# Patient Record
Sex: FEMALE | Race: WHITE | NOT HISPANIC OR LATINO | Employment: UNEMPLOYED | URBAN - METROPOLITAN AREA
[De-identification: names, ages, dates, MRNs, and addresses within clinical notes are randomized per-mention and may not be internally consistent; named-entity substitution may affect disease eponyms.]

---

## 2017-02-08 ENCOUNTER — TELEMEDICINE2 (OUTPATIENT)
Dept: MEDICAL GROUP | Age: 52
End: 2017-02-08
Payer: MEDICAID

## 2017-02-08 ENCOUNTER — TELEMEDICINE ORIGINATING SITE VISIT (OUTPATIENT)
Dept: MEDICAL GROUP | Facility: CLINIC | Age: 52
End: 2017-02-08
Payer: MEDICAID

## 2017-02-08 VITALS
RESPIRATION RATE: 16 BRPM | BODY MASS INDEX: 23.92 KG/M2 | TEMPERATURE: 97.5 F | WEIGHT: 130 LBS | HEIGHT: 62 IN | SYSTOLIC BLOOD PRESSURE: 156 MMHG | OXYGEN SATURATION: 94 % | DIASTOLIC BLOOD PRESSURE: 98 MMHG | HEART RATE: 58 BPM

## 2017-02-08 DIAGNOSIS — F32.A DEPRESSION, UNSPECIFIED DEPRESSION TYPE: ICD-10-CM

## 2017-02-08 DIAGNOSIS — F41.9 ANXIETY: ICD-10-CM

## 2017-02-08 DIAGNOSIS — R10.11 RIGHT UPPER QUADRANT PAIN: ICD-10-CM

## 2017-02-08 DIAGNOSIS — Z13.220 SCREENING CHOLESTEROL LEVEL: ICD-10-CM

## 2017-02-08 DIAGNOSIS — I10 ESSENTIAL HYPERTENSION: ICD-10-CM

## 2017-02-08 DIAGNOSIS — E55.9 VITAMIN D DEFICIENCY: ICD-10-CM

## 2017-02-08 DIAGNOSIS — R53.83 FATIGUE, UNSPECIFIED TYPE: ICD-10-CM

## 2017-02-08 PROCEDURE — 99214 OFFICE O/P EST MOD 30 MIN: CPT | Mod: GT | Performed by: INTERNAL MEDICINE

## 2017-02-08 RX ORDER — BUPROPION HYDROCHLORIDE 150 MG/1
150 TABLET ORAL EVERY MORNING
Qty: 30 TAB | Refills: 3 | Status: SHIPPED | OUTPATIENT
Start: 2017-02-08 | End: 2019-04-08

## 2017-02-08 NOTE — MR AVS SNAPSHOT
"Qian Rodarte   2017 2:30 PM   Telemedicine2   MRN: [id number]    Department:  [address] [place] [address]   Dept Phone:  [phone]    Description:  Female : 1965   Provider:  Caitlin Wong M.D.; LEXA GARCIA           Reason for Visit     Medication Refill     Other lab orders      Allergies as of 2017     No Known Allergies      You were diagnosed with     Essential hypertension   [6146165]       Depression, unspecified depression type   [9689057]       Right upper quadrant pain   [[id number]]         Vital Signs     Blood Pressure Pulse Temperature Respirations Height Weight    156/98 mmHg 58 36.4 °C (97.5 °F) 16 1.575 m (5' 2\") 58.968 kg (130 lb)    Body Mass Index Oxygen Saturation Smoking Status             23.77 kg/m2 94% Current Every Day Smoker         Basic Information     Date Of Birth Sex Race Ethnicity Preferred Language    1965 Female White Non- English      Your appointments     Mar 01, 2017  2:30 PM   Telemedicine Clinic Established Pt with Caitlin Wong M.D., LEXA SALOMON[address] [address]   [phone]              Problem List              ICD-10-CM Priority Class Noted - Resolved    HTN (hypertension) I10   2016 - Present    Anxiety F41.9   2016 - Present    Depression F32.9   2016 - Present    Right upper quadrant pain R10.11   2017 - Present      Health Maintenance        Date Due Completion Dates    IMM DTaP/Tdap/Td Vaccine (1 - Tdap) 1983 ---    PAP SMEAR 1985 ---    MAMMOGRAM 2004 ---    COLONOSCOPY 2014 ---    IMM INFLUENZA (1) 2016 ---            Current Immunizations     No immunizations on file.      Below and/or attached are the medications your provider expects you to take. Review all of your home medications and newly ordered medications with your provider and/or pharmacist. Follow medication instructions as directed by your provider " and/or pharmacist. Please keep your medication list with you and share with your provider. Update the information when medications are discontinued, doses are changed, or new medications (including over-the-counter products) are added; and carry medication information at all times in the event of emergency situations     Allergies:  No Known Allergies          Medications  Valid as of: February 08, 2017 -  3:15 PM    Generic Name Brand Name Tablet Size Instructions for use    Propranolol HCl (Tab) INDERAL 10 MG Take 1 Tab by mouth 3 times a day.        .                 Medicines prescribed today were sent to:     [id number] - [place], [place] - [address]    [address] [place] [address]    Phone: [phone] Fax: [phone]    Open 24 Hours?: No      Medication refill instructions:       If your prescription bottle indicates you have medication refills left, it is not necessary to call your provider’s office. Please contact your pharmacy and they will refill your medication.    If your prescription bottle indicates you do not have any refills left, you may request refills at any time through one of the following ways: The online PlastiPure system (except Urgent Care), by calling your provider’s office, or by asking your pharmacy to contact your provider’s office with a refill request. Medication refills are processed only during regular business hours and may not be available until the next business day. Your provider may request additional information or to have a follow-up visit with you prior to refilling your medication.   *Please Note: Medication refills are assigned a new Rx number when refilled electronically. Your pharmacy may indicate that no refills were authorized even though a new prescription for the same medication is available at the pharmacy. Please request the medicine by name with the pharmacy before contacting your provider for a refill.           PlastiPure Access Code:  [email or web address]  Expires: 3/10/2017  3:15 PM    JoySports  A secure, online tool to manage your health information     [employer]’s JoySports® is a secure, online tool that connects you to your personalized health information from the privacy of your home -- day or night - making it very easy for you to manage your healthcare. Once the activation process is completed, you can even access your medical information using the JoySports sirena, which is available for free in the Apple Sirena store or Google Play store.     JoySports provides the following levels of access (as shown below):   My Chart Features   [facility] Primary Care Doctor [facility]  Specialists [facility]  Urgent  Care Non-[facility]  Primary Care  Doctor   Email your healthcare team securely and privately 24/7 X X X    Manage appointments: schedule your next appointment; view details of past/upcoming appointments X      Request prescription refills. X      View recent personal medical records, including lab and immunizations X X X X   View health record, including health history, allergies, medications X X X X   Read reports about your outpatient visits, procedures, consult and ER notes X X X X   See your discharge summary, which is a recap of your hospital and/or ER visit that includes your diagnosis, lab results, and care plan. X X       How to register for JoySports:  1. Go to  [email or web address].  2. Click on the Sign Up Now box, which takes you to the New Member Sign Up page. You will need to provide the following information:  a. Enter your JoySports Access Code exactly as it appears at the top of this page. (You will not need to use this code after you’ve completed the sign-up process. If you do not sign up before the expiration date, you must request a new code.)   b. Enter your date of birth.   c. Enter your home email address.   d. Click Submit, and follow the next screen’s instructions.  3. Create a JoySports ID. This will be your JoySports login ID and cannot be  changed, so think of one that is secure and easy to remember.  4. Create a SportsHedge password. You can change your password at any time.  5. Enter your Password Reset Question and Answer. This can be used at a later time if you forget your password.   6. Enter your e-mail address. This allows you to receive e-mail notifications when new information is available in SportsHedge.  7. Click Sign Up. You can now view your health information.    For assistance activating your SportsHedge account, call [phone]

## 2017-02-13 ENCOUNTER — NON-PROVIDER VISIT (OUTPATIENT)
Dept: MEDICAL GROUP | Facility: CLINIC | Age: 52
End: 2017-02-13
Payer: MEDICAID

## 2017-02-13 NOTE — PROGRESS NOTES
Qian Rodarte is a 51 y.o. female here for a non-provider visit for Venipuncture.    If abnormal was an in office provider notified on receipt of results (if so, indicate provider)? Yes  Routed to PCP? Yes

## 2017-02-13 NOTE — PROGRESS NOTES
"CC: Depression    HPI:   Qian presents today with the following.    1. Essential hypertension  Patient currently taking Inderal 10 mg 3 times a day for blood pressure and for anxiety.    2. Depression, unspecified depression type  Patient has a history of depression and was treated with Prozac for many years. She has been off of her Prozac 2008 but also states that she had a suicide attempt in 2008. Treated inpatient. Patient is noticing increased symptoms of her OCD and depression recurring. Associated with anxiety Her  is disabled, patient is caretaker. Patient has tried Wellbutrin in the past and would like to restart this medication. She is currently not followed by psychology or psychiatry. No last completed.    3. Right upper quadrant pain  Complaints of right upper quadrant fullness, tenderness. No radiation of pain. Not associated with falling. Symptoms present for the last. Has a history of gallbladder disease. Patient denies nausea, vomiting, weight loss, diarrhea. No fevers or chills. No history of liver disease        Patient Active Problem List    Diagnosis Date Noted   • Right upper quadrant pain 02/08/2017   • HTN (hypertension) 06/09/2016   • Anxiety 06/09/2016   • Depression 06/09/2016       Current Outpatient Prescriptions   Medication Sig Dispense Refill   • buPROPion (WELLBUTRIN XL) 150 MG XL tablet Take 1 Tab by mouth every morning. 30 Tab 3   • propranolol (INDERAL) 10 MG Tab Take 1 Tab by mouth 3 times a day. 90 Tab 6     No current facility-administered medications for this visit.         Allergies as of 02/08/2017   • (No Known Allergies)        ROS: As per HPI.    /98 mmHg  Pulse 58  Temp(Src) 36.4 °C (97.5 °F)  Resp 16  Ht 1.575 m (5' 2\")  Wt 58.968 kg (130 lb)  BMI 23.77 kg/m2  SpO2 94%    Physical Exam:  Gen:         Alert and oriented, No apparent distress.  Neck:        No Lymphadenopathy or Bruits.  Lungs:     Clear to auscultation bilaterally  CV:          " Regular rate and rhythm. No murmurs, rubs or gallops.  Abd:         Positive right upper quadrant tenderness, questionable fullness. Questionable palpation of liver edge. No rebound no guarding, positive bowel sounds. Soft. No pulsatile masses.                   Ext:          No clubbing, cyanosis, edema.      Assessment and Plan.   51 y.o. female with the following issues.    1. Essential hypertension  Continue Inderal    - COMP METABOLIC PANEL; Future    2. Depression, unspecified depression type  Start Wellbutrin  Offered resources in [place] to include [facility] health clinic    - TSH; Future  - buPROPion (WELLBUTRIN XL) 150 MG XL tablet; Take 1 Tab by mouth every morning.  Dispense: 30 Tab; Refill: 3    3. Right upper quadrant pain  Labs ordered  Consider abdominal ultrasound  Patient stable    - COMP METABOLIC PANEL; Future  - AMYLASE; Future  - LIPASE; Future    4. Screening cholesterol level    - LIPID PROFILE; Future    5. Vitamin D deficiency    - VITAMIN D,25 HYDROXY; Future    6. Fatigue, unspecified type    - CBC WITH DIFFERENTIAL; Future  - TSH; Future    7. Anxiety   Referred to #2  - buPROPion (WELLBUTRIN XL) 150 MG XL tablet; Take 1 Tab by mouth every morning.  Dispense: 30 Tab; Refill: 3

## 2017-02-13 NOTE — MR AVS SNAPSHOT
Qian Rodarte   2017 11:00 AM   Appointment   MRN: [id number]    Department:  [facility]   Dept Phone:  [phone]    Description:  Female : 1965   Provider:  RADHA HURLEY           Allergies as of 2017     No Known Allergies      Vital Signs     Smoking Status                   Current Every Day Smoker           Basic Information     Date Of Birth Sex Race Ethnicity Preferred Language    1965 Female White Non- English      Your appointments     2017 11:00 AM   Non Provider 1 with RADHA HURLEY   [facility] (--)    [address] [facility] [address]   [phone]           You will be receiving a confirmation call a few days before your appointment from our automated call confirmation system.            Mar 01, 2017  2:30 PM   Telemedicine Clinic Established Pt with Caitlin Wong M.D., [facility] [place] [address] [facility] [id number] [phone]              Problem List              ICD-10-CM Priority Class Noted - Resolved    HTN (hypertension) I10   2016 - Present    Anxiety F41.9   2016 - Present    Depression F32.9   2016 - Present    Right upper quadrant pain R10.11   2017 - Present      Health Maintenance        Date Due Completion Dates    IMM DTaP/Tdap/Td Vaccine (1 - Tdap) 1983 ---    PAP SMEAR 1985 ---    MAMMOGRAM 2004 ---    COLONOSCOPY 2014 ---    IMM INFLUENZA (1) 2016 ---            Current Immunizations     No immunizations on file.      Below and/or attached are the medications your provider expects you to take. Review all of your home medications and newly ordered medications with your provider and/or pharmacist. Follow medication instructions as directed by your provider and/or pharmacist. Please keep your medication list with you and share with your provider. Update the information when medications are discontinued, doses are  changed, or new medications (including over-the-counter products) are added; and carry medication information at all times in the event of emergency situations     Allergies:  No Known Allergies          Medications  Valid as of: February 13, 2017 - 10:30 AM    Generic Name Brand Name Tablet Size Instructions for use    BuPROPion HCl (TABLET SR 24 HR) WELLBUTRIN  MG Take 1 Tab by mouth every morning.        Propranolol HCl (Tab) INDERAL 10 MG Take 1 Tab by mouth 3 times a day.        .                 Medicines prescribed today were sent to:     [facility] - [place], [place] - [address]    [address] [place] [address]    Phone: [phone] Fax: [phone]    Open 24 Hours?: No      Medication refill instructions:       If your prescription bottle indicates you have medication refills left, it is not necessary to call your provider’s office. Please contact your pharmacy and they will refill your medication.    If your prescription bottle indicates you do not have any refills left, you may request refills at any time through one of the following ways: The online ipadio system (except Urgent Care), by calling your provider’s office, or by asking your pharmacy to contact your provider’s office with a refill request. Medication refills are processed only during regular business hours and may not be available until the next business day. Your provider may request additional information or to have a follow-up visit with you prior to refilling your medication.   *Please Note: Medication refills are assigned a new Rx number when refilled electronically. Your pharmacy may indicate that no refills were authorized even though a new prescription for the same medication is available at the pharmacy. Please request the medicine by name with the pharmacy before contacting your provider for a refill.           ipadio Access Code: [email or web address]  Expires: 3/10/2017  3:15 PM    ipadio  A secure, online  tool to manage your health information     [employer]’s [employer]® is a secure, online tool that connects you to your personalized health information from the privacy of your home -- day or night - making it very easy for you to manage your healthcare. Once the activation process is completed, you can even access your medical information using the [employer] sirena, which is available for free in the Apple Sirena store or Google Play store.     [employer] provides the following levels of access (as shown below):   My Chart Features   [facility] Primary Care Doctor [facility]  Specialists [facility]  Urgent  Care Non-[facility]  Primary Care  Doctor   Email your healthcare team securely and privately 24/7 X X X    Manage appointments: schedule your next appointment; view details of past/upcoming appointments X      Request prescription refills. X      View recent personal medical records, including lab and immunizations X X X X   View health record, including health history, allergies, medications X X X X   Read reports about your outpatient visits, procedures, consult and ER notes X X X X   See your discharge summary, which is a recap of your hospital and/or ER visit that includes your diagnosis, lab results, and care plan. X X       How to register for [employer]:  1. Go to  [email or web address].  2. Click on the Sign Up Now box, which takes you to the New Member Sign Up page. You will need to provide the following information:  a. Enter your [employer] Access Code exactly as it appears at the top of this page. (You will not need to use this code after you’ve completed the sign-up process. If you do not sign up before the expiration date, you must request a new code.)   b. Enter your date of birth.   c. Enter your home email address.   d. Click Submit, and follow the next screen’s instructions.  3. Create a [employer] ID. This will be your [employer] login ID and cannot be changed, so think of one that is secure and easy to remember.  4. Create a  Letao password. You can change your password at any time.  5. Enter your Password Reset Question and Answer. This can be used at a later time if you forget your password.   6. Enter your e-mail address. This allows you to receive e-mail notifications when new information is available in Letao.  7. Click Sign Up. You can now view your health information.    For assistance activating your Letao account, call [phone]

## 2017-03-01 ENCOUNTER — TELEMEDICINE2 (OUTPATIENT)
Dept: MEDICAL GROUP | Age: 52
End: 2017-03-01
Payer: MEDICAID

## 2017-03-01 ENCOUNTER — TELEMEDICINE ORIGINATING SITE VISIT (OUTPATIENT)
Dept: MEDICAL GROUP | Facility: CLINIC | Age: 52
End: 2017-03-01
Payer: MEDICAID

## 2017-03-01 VITALS
OXYGEN SATURATION: 98 % | DIASTOLIC BLOOD PRESSURE: 90 MMHG | TEMPERATURE: 98.4 F | HEART RATE: 80 BPM | SYSTOLIC BLOOD PRESSURE: 162 MMHG | BODY MASS INDEX: 24.84 KG/M2 | WEIGHT: 135 LBS | HEIGHT: 62 IN | RESPIRATION RATE: 16 BRPM

## 2017-03-01 DIAGNOSIS — R79.89 ABNORMAL CBC: ICD-10-CM

## 2017-03-01 DIAGNOSIS — I10 ESSENTIAL HYPERTENSION: ICD-10-CM

## 2017-03-01 DIAGNOSIS — F32.A DEPRESSION, UNSPECIFIED DEPRESSION TYPE: ICD-10-CM

## 2017-03-01 PROCEDURE — 99214 OFFICE O/P EST MOD 30 MIN: CPT | Mod: GT | Performed by: INTERNAL MEDICINE

## 2017-03-01 RX ORDER — BUPROPION HYDROCHLORIDE 300 MG/1
300 TABLET ORAL EVERY MORNING
Qty: 30 TAB | Refills: 6 | Status: SHIPPED | OUTPATIENT
Start: 2017-03-01 | End: 2017-10-02 | Stop reason: SDUPTHER

## 2017-03-01 NOTE — PROGRESS NOTES
CC: Follow-up lab work, medication change    Verified identification with patient. Secured video conference with RN presenter in [facility]      HPI:   Qian presents today with the following.    1. Essential hypertension  Patient currently takes Inderal 10 mg 3 times a day for blood pressure management and to help with anxiety. Blood pressure elevated today 162/90, repeat 167/92. Patient does not have a blood pressure monitor at home.    2. Depression, unspecified depression type  Patient started Wellbutrin  mg daily. Patient states that she feels about 30% better. Her mood and depression have improved. Also sees improvement in anxiety and OCD. No side effect of medication. Patient is less reclusive, has less anxiety outside the home.    3. Abnormal CBC  Abnormal CBC with slightly elevated WBC at 12,000, increase hemoglobin and platelets. Increased absolute neutrophil count and decreased monocytes. Patient has a family history of leukemia in her mom.    5. Abdominal pain  Symptoms of abdominal discomfort and fullness has significantly decreased. Patient discontinued chewing tobacco and alcohol. Patient was consuming 32 ounces of beer a day. CMP, amylase and lipase are all within normal limits. TSH 2.5.      Patient Active Problem List    Diagnosis Date Noted   • Abnormal CBC 03/01/2017   • Right upper quadrant pain 02/08/2017   • HTN (hypertension) 06/09/2016   • Anxiety 06/09/2016   • Depression 06/09/2016       Current Outpatient Prescriptions   Medication Sig Dispense Refill   • buPROPion (WELLBUTRIN XL) 300 MG XL tablet Take 1 Tab by mouth every morning. 30 Tab 6   • buPROPion (WELLBUTRIN XL) 150 MG XL tablet Take 1 Tab by mouth every morning. 30 Tab 3   • propranolol (INDERAL) 10 MG Tab Take 1 Tab by mouth 3 times a day. 90 Tab 6     No current facility-administered medications for this visit.         Allergies as of 03/01/2017   • (No Known Allergies)        ROS: As per HPI. All other symptoms  "constitutional, cardiopulmonary, GI, neurologic negative    /90 mmHg  Pulse 80  Temp(Src) 36.9 °C (98.4 °F)  Resp 16  Ht 1.575 m (5' 2\")  Wt 61.236 kg (135 lb)  BMI 24.69 kg/m2  SpO2 98%    Physical Exam:  Gen:         Alert and oriented, No apparent distress.  Neck:        No Lymphadenopathy or Bruits.  Lungs:     Clear to auscultation bilaterally  CV:          Regular rate and rhythm. No murmurs, rubs or gallops.  Abd:         Soft non tender, non distended. Normal active bowel sounds.  No  Hepatosplenomegaly, No pulsatile masses.                   Ext:          No clubbing, cyanosis, edema.      Assessment and Plan.   51 y.o. female with the following issues.    1. Essential hypertension  Patient will purchase a blood pressure monitor and wishes to keep a blood pressure log. Recheck in 2 weeks    2. Depression, unspecified depression type  Increase Wellbutrin to 300 mg daily  Follow-up in 2 weeks    - buPROPion (WELLBUTRIN XL) 300 MG XL tablet; Take 1 Tab by mouth every morning.  Dispense: 30 Tab; Refill: 6    3. Abnormal CBC  Recheck CBC    - CBC WITH DIFFERENTIAL; Future            Please note that this dictation was created using voice recognition software. I have made every reasonable attempt to correct obvious errors, but expect that there are errors of grammar and possible content that I did not discover before finalizing note.   "

## 2017-03-01 NOTE — MR AVS SNAPSHOT
"Qian Rodarte   3/1/2017 2:30 PM   Telemedicine2   MRN: [id number]    Department:  [address] [place] [address]   Dept Phone:  [phone]    Description:  Female : 1965   Provider:  Caitlin Wong M.D.; LEXA GARCIA           Reason for Visit     Follow-Up labs      Allergies as of 3/1/2017     No Known Allergies      You were diagnosed with     Essential hypertension   [7591029]       Depression, unspecified depression type   [4447555]       Abnormal CBC   [[id number]]         Vital Signs     Blood Pressure Pulse Temperature Respirations Height Weight    162/90 mmHg 80 36.9 °C (98.4 °F) 16 1.575 m (5' 2\") 61.236 kg (135 lb)    Body Mass Index Oxygen Saturation Smoking Status             24.69 kg/m2 98% Current Every Day Smoker         Basic Information     Date Of Birth Sex Race Ethnicity Preferred Language    1965 Female White Non- English      Your appointments     Mar 22, 2017  1:00 PM   Telemedicine Clinic Established Pt with Caitlin Wong M.D., LEXA GARCIA   [address])    [address] [place] [id number]   [phone]              Problem List              ICD-10-CM Priority Class Noted - Resolved    HTN (hypertension) I10   2016 - Present    Anxiety F41.9   2016 - Present    Depression F32.9   2016 - Present    Right upper quadrant pain R10.11   2017 - Present    Abnormal CBC R79.89   3/1/2017 - Present      Health Maintenance        Date Due Completion Dates    IMM DTaP/Tdap/Td Vaccine (1 - Tdap) 1983 ---    PAP SMEAR 1985 ---    MAMMOGRAM 2004 ---    COLONOSCOPY 2014 ---    IMM INFLUENZA (1) 2016 ---            Current Immunizations     No immunizations on file.      Below and/or attached are the medications your provider expects you to take. Review all of your home medications and newly ordered medications with your provider and/or pharmacist. Follow medication instructions as directed by your " provider and/or pharmacist. Please keep your medication list with you and share with your provider. Update the information when medications are discontinued, doses are changed, or new medications (including over-the-counter products) are added; and carry medication information at all times in the event of emergency situations     Allergies:  No Known Allergies          Medications  Valid as of: March 01, 2017 -  2:53 PM    Generic Name Brand Name Tablet Size Instructions for use    BuPROPion HCl (TABLET SR 24 HR) WELLBUTRIN  MG Take 1 Tab by mouth every morning.        Propranolol HCl (Tab) INDERAL 10 MG Take 1 Tab by mouth 3 times a day.        .                 Medicines prescribed today were sent to:     [id number] - [place], [place] - [address]    [address] [place] [address]    Phone: [phone] Fax: [phone]    Open 24 Hours?: No      Medication refill instructions:       If your prescription bottle indicates you have medication refills left, it is not necessary to call your provider’s office. Please contact your pharmacy and they will refill your medication.    If your prescription bottle indicates you do not have any refills left, you may request refills at any time through one of the following ways: The online [employer] system (except Urgent Care), by calling your provider’s office, or by asking your pharmacy to contact your provider’s office with a refill request. Medication refills are processed only during regular business hours and may not be available until the next business day. Your provider may request additional information or to have a follow-up visit with you prior to refilling your medication.   *Please Note: Medication refills are assigned a new Rx number when refilled electronically. Your pharmacy may indicate that no refills were authorized even though a new prescription for the same medication is available at the pharmacy. Please request the medicine by name with  the pharmacy before contacting your provider for a refill.           Cryoocyte Access Code: [email or web address]  Expires: 3/10/2017  3:15 PM    Cryoocyte  A secure, online tool to manage your health information     [employer]’s Cryoocyte® is a secure, online tool that connects you to your personalized health information from the privacy of your home -- day or night - making it very easy for you to manage your healthcare. Once the activation process is completed, you can even access your medical information using the Cryoocyte sirena, which is available for free in the Apple Sirena store or Google Play store.     Cryoocyte provides the following levels of access (as shown below):   My Chart Features   [facility] Doctor [facility]  Specialists [facility]  Urgent  [facility]  Primary Care  Doctor   Email your healthcare team securely and privately 24/7 X X X    Manage appointments: schedule your next appointment; view details of past/upcoming appointments X      Request prescription refills. X      View recent personal medical records, including lab and immunizations X X X X   View health record, including health history, allergies, medications X X X X   Read reports about your outpatient visits, procedures, consult and ER notes X X X X   See your discharge summary, which is a recap of your hospital and/or ER visit that includes your diagnosis, lab results, and care plan. X X       How to register for Cryoocyte:  1. Go to  [email or web address]  2. Click on the Sign Up Now box, which takes you to the New Member Sign Up page. You will need to provide the following information:  a. Enter your Cryoocyte Access Code exactly as it appears at the top of this page. (You will not need to use this code after you’ve completed the sign-up process. If you do not sign up before the expiration date, you must request a new code.)   b. Enter your date of birth.   c. Enter your home email address.   d. Click Submit, and follow the next  screen’s instructions.  3. Create a Thename.ist ID. This will be your Thename.ist login ID and cannot be changed, so think of one that is secure and easy to remember.  4. Create a Thename.ist password. You can change your password at any time.  5. Enter your Password Reset Question and Answer. This can be used at a later time if you forget your password.   6. Enter your e-mail address. This allows you to receive e-mail notifications when new information is available in ExecNote.  7. Click Sign Up. You can now view your health information.    For assistance activating your ExecNote account, call [phone]

## 2017-03-15 ENCOUNTER — NON-PROVIDER VISIT (OUTPATIENT)
Dept: MEDICAL GROUP | Facility: CLINIC | Age: 52
End: 2017-03-15
Payer: MEDICAID

## 2017-03-15 DIAGNOSIS — R79.89 ABNORMAL CBC: ICD-10-CM

## 2017-03-15 PROCEDURE — 36415 COLL VENOUS BLD VENIPUNCTURE: CPT | Performed by: FAMILY MEDICINE

## 2017-03-15 NOTE — PROGRESS NOTES
Qian Rodarte is a 51 y.o. female here for a non-provider visit for venipuncture.  Labs sent to [facility] for results.    If abnormal was an in office provider notified today (if so, indicate provider)? Yes  Routed to PCP? Yes

## 2017-03-22 DIAGNOSIS — I10 ESSENTIAL HYPERTENSION: ICD-10-CM

## 2017-03-22 RX ORDER — PROPRANOLOL HYDROCHLORIDE 10 MG/1
10 TABLET ORAL 3 TIMES DAILY
Qty: 90 TAB | Refills: 6 | Status: CANCELLED | OUTPATIENT
Start: 2017-03-22

## 2017-03-23 DIAGNOSIS — I10 ESSENTIAL HYPERTENSION: ICD-10-CM

## 2017-03-23 RX ORDER — PROPRANOLOL HYDROCHLORIDE 10 MG/1
10 TABLET ORAL 3 TIMES DAILY
Qty: 90 TAB | Refills: 6 | Status: SHIPPED | OUTPATIENT
Start: 2017-03-23 | End: 2017-10-02 | Stop reason: SDUPTHER

## 2017-03-28 ENCOUNTER — TELEMEDICINE ORIGINATING SITE VISIT (OUTPATIENT)
Dept: MEDICAL GROUP | Facility: CLINIC | Age: 52
End: 2017-03-28
Payer: MEDICAID

## 2017-03-28 ENCOUNTER — TELEMEDICINE2 (OUTPATIENT)
Dept: MEDICAL GROUP | Age: 52
End: 2017-03-28
Payer: MEDICAID

## 2017-03-28 VITALS
TEMPERATURE: 98.8 F | RESPIRATION RATE: 16 BRPM | SYSTOLIC BLOOD PRESSURE: 147 MMHG | BODY MASS INDEX: 25.21 KG/M2 | WEIGHT: 137 LBS | HEIGHT: 62 IN | HEART RATE: 74 BPM | OXYGEN SATURATION: 95 % | DIASTOLIC BLOOD PRESSURE: 87 MMHG

## 2017-03-28 DIAGNOSIS — R79.89 ABNORMAL CBC: ICD-10-CM

## 2017-03-28 DIAGNOSIS — I10 ESSENTIAL HYPERTENSION: ICD-10-CM

## 2017-03-28 DIAGNOSIS — Z12.39 BREAST CANCER SCREENING: ICD-10-CM

## 2017-03-28 DIAGNOSIS — F32.A DEPRESSION, UNSPECIFIED DEPRESSION TYPE: ICD-10-CM

## 2017-03-28 PROCEDURE — 99214 OFFICE O/P EST MOD 30 MIN: CPT | Mod: GT | Performed by: INTERNAL MEDICINE

## 2017-03-28 ASSESSMENT — PATIENT HEALTH QUESTIONNAIRE - PHQ9: CLINICAL INTERPRETATION OF PHQ2 SCORE: 0

## 2017-03-28 NOTE — MR AVS SNAPSHOT
"Qian Rodarte   3/28/2017 2:00 PM   Telemedicine2   MRN: [id number]    Department:  [address] [place] [address]   Dept Phone:  [phone]    Description:  Female : 1965   Provider:  Caitlin Wong M.D.; [facility] [place]           Reason for Visit     Follow-Up     Hypertension           Allergies as of 3/28/2017     No Known Allergies      You were diagnosed with     Essential hypertension   [[id number]]       Depression, unspecified depression type   [[id number]]       Abnormal CBC   [[id number]]         Vital Signs     Blood Pressure Pulse Temperature Respirations Height Weight    147/87 mmHg 74 37.1 °C (98.8 °F) 16 1.575 m (5' 2\") 62.143 kg (137 lb)    Body Mass Index Oxygen Saturation Smoking Status             25.05 kg/m2 95% Current Every Day Smoker         Basic Information     Date Of Birth Sex Race Ethnicity Preferred Language    1965 Female White Non- English      Problem List              ICD-10-CM Priority Class Noted - Resolved    HTN (hypertension) I10   2016 - Present    Anxiety F41.9   2016 - Present    Depression F32.9   2016 - Present    Right upper quadrant pain R10.11   2017 - Present    Abnormal CBC R79.89   3/1/2017 - Present      Health Maintenance        Date Due Completion Dates    IMM DTaP/Tdap/Td Vaccine (1 - Tdap) 1983 ---    PAP SMEAR 1985 ---    MAMMOGRAM 2004 ---    COLONOSCOPY 2014 ---    IMM INFLUENZA (1) 2016 ---            Current Immunizations     No immunizations on file.      Below and/or attached are the medications your provider expects you to take. Review all of your home medications and newly ordered medications with your provider and/or pharmacist. Follow medication instructions as directed by your provider and/or pharmacist. Please keep your medication list with you and share with your provider. Update the information when medications are discontinued, doses are changed, or new medications (including over-the-counter " products) are added; and carry medication information at all times in the event of emergency situations     Allergies:  No Known Allergies          Medications  Valid as of: March 28, 2017 -  2:03 PM    Generic Name Brand Name Tablet Size Instructions for use    BuPROPion HCl (TABLET SR 24 HR) WELLBUTRIN  MG Take 1 Tab by mouth every morning.        BuPROPion HCl (TABLET SR 24 HR) WELLBUTRIN  MG Take 1 Tab by mouth every morning.        Propranolol HCl (Tab) INDERAL 10 MG Take 1 Tab by mouth 3 times a day.        .                 Medicines prescribed today were sent to:     [id number] - [place], [place] - [address]    [address] [place] [address]    Phone: [phone] Fax: [phone]    Open 24 Hours?: No      Medication refill instructions:       If your prescription bottle indicates you have medication refills left, it is not necessary to call your provider’s office. Please contact your pharmacy and they will refill your medication.    If your prescription bottle indicates you do not have any refills left, you may request refills at any time through one of the following ways: The online [employer] system (except Urgent Care), by calling your provider’s office, or by asking your pharmacy to contact your provider’s office with a refill request. Medication refills are processed only during regular business hours and may not be available until the next business day. Your provider may request additional information or to have a follow-up visit with you prior to refilling your medication.   *Please Note: Medication refills are assigned a new Rx number when refilled electronically. Your pharmacy may indicate that no refills were authorized even though a new prescription for the same medication is available at the pharmacy. Please request the medicine by name with the pharmacy before contacting your provider for a refill.           [employer] Access Code: [email or web address]  Expires: 4/7/2017   1:24 PM    LOC&ALL  A secure, online tool to manage your health information     [employer]’s LOC&ALL® is a secure, online tool that connects you to your personalized health information from the privacy of your home -- day or night - making it very easy for you to manage your healthcare. Once the activation process is completed, you can even access your medical information using the LOC&ALL sirena, which is available for free in the Apple Sirena store or Google Play store.     LOC&ALL provides the following levels of access (as shown below):   My Chart Features   [facility] Primary Care Doctor [facility]  Specialists [facility]  Urgent  Care Non-[facility]  Primary Care  Doctor   Email your healthcare team securely and privately 24/7 X X X    Manage appointments: schedule your next appointment; view details of past/upcoming appointments X      Request prescription refills. X      View recent personal medical records, including lab and immunizations X X X X   View health record, including health history, allergies, medications X X X X   Read reports about your outpatient visits, procedures, consult and ER notes X X X X   See your discharge summary, which is a recap of your hospital and/or ER visit that includes your diagnosis, lab results, and care plan. X X       How to register for LOC&ALL:  1. Go to  [email or web address].  2. Click on the Sign Up Now box, which takes you to the New Member Sign Up page. You will need to provide the following information:  a. Enter your LOC&ALL Access Code exactly as it appears at the top of this page. (You will not need to use this code after you’ve completed the sign-up process. If you do not sign up before the expiration date, you must request a new code.)   b. Enter your date of birth.   c. Enter your home email address.   d. Click Submit, and follow the next screen’s instructions.  3. Create a LOC&ALL ID. This will be your LOC&ALL login ID and cannot be changed, so think of one that is  secure and easy to remember.  4. Create a Trovebox password. You can change your password at any time.  5. Enter your Password Reset Question and Answer. This can be used at a later time if you forget your password.   6. Enter your e-mail address. This allows you to receive e-mail notifications when new information is available in Trovebox.  7. Click Sign Up. You can now view your health information.    For assistance activating your Trovebox account, call [phone]        Quit Tobacco Information     Do you want to quit using tobacco?    Quitting tobacco decreases risks of cancer, heart and lung disease, increases life expectancy, improves sense of taste and smell, and increases spending money, among other benefits.    If you are thinking about quitting, we can help.  • [facility] Quit Tobacco Program: [phone]  o Program occurs weekly for four weeks and includes pharmacist consultation on products to support quitting smoking or chewing tobacco. A provider referral is needed for pharmacist consultation.  • Tobacco Users Help Hotline: 7-480-QUIT-NOW (685-7857) or https://[place].quitlogix.org/  o Free, confidential telephone and online coaching for [place] residents. Sessions are designed on a schedule that is convenient for you. Eligible clients receive free nicotine replacement therapy.  • Nationally: www.smokefree.gov  o Information and professional assistance to support both immediate and long-term needs as you become, and remain, a non-smoker. Smokefree.gov allows you to choose the help that best fits your needs.

## 2017-03-28 NOTE — PROGRESS NOTES
"CC: Follow-up lab work, medications    Verified identification with patient. Secured video conference with RN presenter in [facility]      HPI:   Qian presents today with the following.    1. Essential hypertension  Patient currently takes Inderal 10 mg by mouth 3 times a day. Patient has ordered a blood pressure monitor and will be checking her blood pressure regularly. Patient denies headaches, dizziness, chest pain, shortness of breath or palpitations. Blood pressure today 147/87. Inderal helps with her anxiety.    2. Depression, unspecified depression type  Patient is doing extremely well with her increase her Wellbutrin to 300 mg daily. Patient's mood has much improved, increased motivation and helped with anxiety. Patient is now in contact with her daughter and mom and has reconnected.    3. Abnormal CBC  White blood cell 9.2 down from 12. Patient was concerned with her mom having a history of CLL.    4. Breast cancer screening  No mammogram since 2008      Patient Active Problem List    Diagnosis Date Noted   • Abnormal CBC 03/01/2017   • Right upper quadrant pain 02/08/2017   • HTN (hypertension) 06/09/2016   • Anxiety 06/09/2016   • Depression 06/09/2016       Current Outpatient Prescriptions   Medication Sig Dispense Refill   • propranolol (INDERAL) 10 MG Tab Take 1 Tab by mouth 3 times a day. 90 Tab 6   • buPROPion (WELLBUTRIN XL) 300 MG XL tablet Take 1 Tab by mouth every morning. 30 Tab 6   • buPROPion (WELLBUTRIN XL) 150 MG XL tablet Take 1 Tab by mouth every morning. 30 Tab 3     No current facility-administered medications for this visit.         Allergies as of 03/28/2017   • (No Known Allergies)        ROS: As per HPI. Patient denies all other cardiopulmonary, GI, neurologic symptoms    /87 mmHg  Pulse 74  Temp(Src) 37.1 °C (98.8 °F)  Resp 16  Ht 1.575 m (5' 2\")  Wt 62.143 kg (137 lb)  BMI 25.05 kg/m2  SpO2 95%    Physical Exam:  Gen:         Alert and oriented, No apparent " distress.  Neck:        No Lymphadenopathy   Lungs:     Clear to auscultation bilaterally  CV:          Regular rate and rhythm. No murmurs, rubs or gallops.  Abd:         Soft non tender, non distended. Normal active bowel sounds.  No  Hepatosplenomegaly, No pulsatile masses.                   Ext:          No clubbing, cyanosis, edema.      Assessment and Plan.   51 y.o. female with the following issues.    1. Essential hypertension  Continue Inderal at current dose  Patient will keep blood pressure log    2. Depression, unspecified depression type  Continue Wellbutrin 300 mg by mouth daily    3. Abnormal CBC  Stable    4. Breast cancer screening    - MA-SCREEN MAMMO W/CAD-BILAT; Future            Please note that this dictation was created using voice recognition software. I have made every reasonable attempt to correct obvious errors, but expect that there are errors of grammar and possible content that I did not discover before finalizing note.

## 2017-10-02 ENCOUNTER — TELEMEDICINE2 (OUTPATIENT)
Dept: MEDICAL GROUP | Age: 52
End: 2017-10-02
Payer: MEDICAID

## 2017-10-02 ENCOUNTER — TELEMEDICINE ORIGINATING SITE VISIT (OUTPATIENT)
Dept: MEDICAL GROUP | Facility: CLINIC | Age: 52
End: 2017-10-02
Payer: MEDICAID

## 2017-10-02 VITALS
RESPIRATION RATE: 16 BRPM | BODY MASS INDEX: 23.19 KG/M2 | WEIGHT: 126 LBS | HEART RATE: 67 BPM | TEMPERATURE: 98.7 F | DIASTOLIC BLOOD PRESSURE: 87 MMHG | SYSTOLIC BLOOD PRESSURE: 147 MMHG | OXYGEN SATURATION: 97 % | HEIGHT: 62 IN

## 2017-10-02 DIAGNOSIS — F32.A DEPRESSION, UNSPECIFIED DEPRESSION TYPE: ICD-10-CM

## 2017-10-02 DIAGNOSIS — I10 ESSENTIAL HYPERTENSION: ICD-10-CM

## 2017-10-02 PROCEDURE — 99213 OFFICE O/P EST LOW 20 MIN: CPT | Mod: GT | Performed by: INTERNAL MEDICINE

## 2017-10-02 RX ORDER — PROPRANOLOL HYDROCHLORIDE 10 MG/1
10 TABLET ORAL 3 TIMES DAILY
Qty: 90 TAB | Refills: 8 | Status: SHIPPED | OUTPATIENT
Start: 2017-10-02 | End: 2019-01-07 | Stop reason: SDUPTHER

## 2017-10-02 RX ORDER — BUPROPION HYDROCHLORIDE 300 MG/1
300 TABLET ORAL EVERY MORNING
Qty: 30 TAB | Refills: 8 | Status: SHIPPED | OUTPATIENT
Start: 2017-10-02 | End: 2018-06-27 | Stop reason: SDUPTHER

## 2017-10-02 ASSESSMENT — PATIENT HEALTH QUESTIONNAIRE - PHQ9: CLINICAL INTERPRETATION OF PHQ2 SCORE: 0

## 2017-10-02 NOTE — PROGRESS NOTES
"CC: Medication refills    Verified identification with patient. Secured video conference with RN presenter in [facility]      HPI:   Qian presents today with the following.    1. Essential hypertension  Blood pressure stable on Inderal 10 mg by mouth 3 times a day. Average blood sugar 130/80.  Requesting refill of Inderal    2. Depression, unspecified depression type  Patient doing well with Wellbutrin 300 mg daily. Mild depression but feels very stable  Requesting refill    3. Prevention  Labs due in February 2018  Patient declines mammogram  Patient declines screening colonoscopy  Pap smear greater than 10 years ago      Patient Active Problem List    Diagnosis Date Noted   • Abnormal CBC 03/01/2017   • Right upper quadrant pain 02/08/2017   • HTN (hypertension) 06/09/2016   • Anxiety 06/09/2016   • Depression 06/09/2016       Current Outpatient Prescriptions   Medication Sig Dispense Refill   • propranolol (INDERAL) 10 MG Tab Take 1 Tab by mouth 3 times a day. 90 Tab 8   • buPROPion (WELLBUTRIN XL) 300 MG XL tablet Take 1 Tab by mouth every morning. 30 Tab 8   • buPROPion (WELLBUTRIN XL) 150 MG XL tablet Take 1 Tab by mouth every morning. 30 Tab 3     No current facility-administered medications for this visit.          Allergies as of 10/02/2017   • (No Known Allergies)        ROS: As per HPI.Patient denies cardiopulmonary, GI, neurologic symptoms    /87   Pulse 67   Temp 37.1 °C (98.7 °F)   Resp 16   Ht 1.575 m (5' 2\")   Wt 57.2 kg (126 lb)   SpO2 97%   BMI 23.05 kg/m²     Physical Exam:  Gen:         Alert and oriented, No apparent distress.  Neck:        No Lymphadenopathy .  Lungs:     Clear to auscultation bilaterally, no wheezes rhonchi or crackles  CV:          Regular rate and rhythm. No murmurs, rubs or gallops.  Abd:         Soft non tender, non distended.                   Ext:          No clubbing, cyanosis, edema.      Assessment and Plan.   52 y.o. female with the following " issues.    1. Essential hypertension  Stable  - propranolol (INDERAL) 10 MG Tab; Take 1 Tab by mouth 3 times a day.  Dispense: 90 Tab; Refill: 8    2. Depression, unspecified depression type  Stable  - buPROPion (WELLBUTRIN XL) 300 MG XL tablet; Take 1 Tab by mouth every morning.  Dispense: 30 Tab; Refill: 8    3. Prevention  Labs due in February 2018  Recommend self breast exams  Recommend FIT test  Recommend Pap smear      Please note that this dictation was created using voice recognition software. I have made every reasonable attempt to correct obvious errors, but expect that there are errors of grammar and possible content that I did not discover before finalizing note.

## 2018-01-10 ENCOUNTER — TELEMEDICINE2 (OUTPATIENT)
Dept: MEDICAL GROUP | Age: 53
End: 2018-01-10
Payer: MEDICAID

## 2018-01-10 ENCOUNTER — TELEMEDICINE ORIGINATING SITE VISIT (OUTPATIENT)
Dept: MEDICAL GROUP | Facility: CLINIC | Age: 53
End: 2018-01-10
Payer: MEDICAID

## 2018-01-10 VITALS
WEIGHT: 122 LBS | BODY MASS INDEX: 22.45 KG/M2 | HEART RATE: 75 BPM | SYSTOLIC BLOOD PRESSURE: 165 MMHG | RESPIRATION RATE: 16 BRPM | DIASTOLIC BLOOD PRESSURE: 86 MMHG | OXYGEN SATURATION: 94 % | HEIGHT: 62 IN | TEMPERATURE: 98.2 F

## 2018-01-10 DIAGNOSIS — Z72.0 TOBACCO ABUSE: ICD-10-CM

## 2018-01-10 DIAGNOSIS — E55.9 VITAMIN D DEFICIENCY: ICD-10-CM

## 2018-01-10 DIAGNOSIS — R79.89 ABNORMAL LFTS: ICD-10-CM

## 2018-01-10 DIAGNOSIS — Z13.220 SCREENING CHOLESTEROL LEVEL: ICD-10-CM

## 2018-01-10 DIAGNOSIS — R10.11 ABDOMINAL DISCOMFORT IN RIGHT UPPER QUADRANT: ICD-10-CM

## 2018-01-10 DIAGNOSIS — R10.11 RIGHT UPPER QUADRANT PAIN: ICD-10-CM

## 2018-01-10 DIAGNOSIS — J40 BRONCHITIS: ICD-10-CM

## 2018-01-10 DIAGNOSIS — F32.A DEPRESSION, UNSPECIFIED DEPRESSION TYPE: ICD-10-CM

## 2018-01-10 PROCEDURE — 99214 OFFICE O/P EST MOD 30 MIN: CPT | Performed by: INTERNAL MEDICINE

## 2018-01-10 RX ORDER — AZITHROMYCIN 250 MG/1
TABLET, FILM COATED ORAL
Qty: 6 TAB | Refills: 0 | Status: SHIPPED
Start: 2018-01-10 | End: 2020-01-14

## 2018-01-10 RX ORDER — LISINOPRIL 20 MG/1
TABLET ORAL
COMMUNITY
Start: 2017-10-19 | End: 2019-12-31
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
## 2018-01-30 ENCOUNTER — NON-PROVIDER VISIT (OUTPATIENT)
Dept: MEDICAL GROUP | Facility: CLINIC | Age: 53
End: 2018-01-30
Payer: MEDICAID

## 2018-01-30 DIAGNOSIS — R10.11 ABDOMINAL DISCOMFORT IN RIGHT UPPER QUADRANT: ICD-10-CM

## 2018-01-30 PROCEDURE — 36415 COLL VENOUS BLD VENIPUNCTURE: CPT | Performed by: INTERNAL MEDICINE

## 2018-01-30 NOTE — NON-PROVIDER
Qian Rodarte is a 52 y.o. female here for a non-provider visit for venipuncture.    If abnormal was an in office provider notified today (if so, indicate provider)? Yes  Routed to PCP? Yes

## 2018-02-15 ENCOUNTER — TELEMEDICINE2 (OUTPATIENT)
Dept: MEDICAL GROUP | Age: 53
End: 2018-02-15
Payer: MEDICAID

## 2018-02-15 ENCOUNTER — TELEMEDICINE ORIGINATING SITE VISIT (OUTPATIENT)
Dept: MEDICAL GROUP | Facility: CLINIC | Age: 53
End: 2018-02-15
Payer: MEDICAID

## 2018-02-15 VITALS
OXYGEN SATURATION: 97 % | RESPIRATION RATE: 16 BRPM | WEIGHT: 122 LBS | HEIGHT: 62 IN | SYSTOLIC BLOOD PRESSURE: 172 MMHG | BODY MASS INDEX: 22.45 KG/M2 | HEART RATE: 84 BPM | TEMPERATURE: 98.8 F | DIASTOLIC BLOOD PRESSURE: 100 MMHG

## 2018-02-15 DIAGNOSIS — F41.9 ANXIETY: ICD-10-CM

## 2018-02-15 DIAGNOSIS — I10 ESSENTIAL HYPERTENSION: ICD-10-CM

## 2018-02-15 DIAGNOSIS — E03.9 ACQUIRED HYPOTHYROIDISM: ICD-10-CM

## 2018-02-15 DIAGNOSIS — R79.89 ELEVATED LIVER FUNCTION TESTS: ICD-10-CM

## 2018-02-15 DIAGNOSIS — R10.11 ABDOMINAL DISCOMFORT IN RIGHT UPPER QUADRANT: ICD-10-CM

## 2018-02-15 PROCEDURE — 99214 OFFICE O/P EST MOD 30 MIN: CPT | Performed by: INTERNAL MEDICINE

## 2018-02-15 RX ORDER — LISINOPRIL 20 MG/1
TABLET ORAL
COMMUNITY
Start: 2018-01-15 | End: 2018-06-27 | Stop reason: SDUPTHER

## 2018-02-20 NOTE — PROGRESS NOTES
CC: Follow-up lab work    Verified identification with patient. Secured video conference with RN presenter in [facility]      HPI:   Qian presents today with the following.    1. Abdominal discomfort in right upper quadrant  1/10/18: Patient concerned about a fullness in her right upper quadrant. Describes it as a pressure, occasionally radiating aching to the back. She has a history of heavy alcohol use but discontinued alcohol one year ago. She is concerned about liver disease. Patient completed ultrasound of the liver in October which showed a possible fatty infiltration versus fibrosis versus hepatitis     2/15/18: Patient's abdominal discomfort is slowly improving after having quit smoking. Patient has also been trying to alter her diet to eating more healthy and cold foods. She has also started with yoga which has helped her symptoms. Patient completed lab work.      2. Elevated liver function tests  ALT, AST within normal limits. Alpha-fetoprotein negative. Hepatitis C negative. Hepatitis B surface antibody positive for immunity. Hepatitis B core negative, hepatitis B surface antigen negative.    3. Essential hypertension  Patiently takes propranolol 10 mg by mouth 3 times a day. Patient not taking lisinopril. Blood pressure usually ranges on average 140/80. Patient denies chest pain, headache, dizziness, shortness of breath, edema or palpitations. Patient is a bit anxious about her labs today    4. Acquired hypothyroidism  TSH 5.8. Not taking thyroid supplements. No PMH or family history of thyroid disease that she knows of.      Patient Active Problem List    Diagnosis Date Noted   • Elevated liver function tests 02/15/2018   • Acquired hypothyroidism 02/15/2018   • Bronchitis 01/10/2018   • Tobacco abuse 01/10/2018   • Abnormal CBC 03/01/2017   • Abdominal discomfort in right upper quadrant 02/08/2017   • HTN (hypertension) 06/09/2016   • Anxiety 06/09/2016   • Depression 06/09/2016       Current  "Outpatient Prescriptions   Medication Sig Dispense Refill   • lisinopril (PRINIVIL) 20 MG Tab      • lisinopril (PRINIVIL) 20 MG Tab      • azithromycin (ZITHROMAX) 250 MG Tab Take 2 tablets on day one, then one tablet daily 6 Tab 0   • propranolol (INDERAL) 10 MG Tab Take 1 Tab by mouth 3 times a day. 90 Tab 8   • buPROPion (WELLBUTRIN XL) 300 MG XL tablet Take 1 Tab by mouth every morning. 30 Tab 8   • buPROPion (WELLBUTRIN XL) 150 MG XL tablet Take 1 Tab by mouth every morning. 30 Tab 3     No current facility-administered medications for this visit.          Allergies as of 02/15/2018   • (No Known Allergies)        ROS: As per HPI. Patient denies any cardiopulmonary, GI, neurologic symptoms    BP (!) 172/100   Pulse 84   Temp 37.1 °C (98.8 °F)   Resp 16   Ht 1.575 m (5' 2\")   Wt 55.3 kg (122 lb)   SpO2 97%   BMI 22.31 kg/m²     Physical Exam:  Gen:         Alert and oriented, No apparent distress.  Neck:        No Lymphadenopathy or Bruits.  Lungs:     Clear to auscultation bilaterally, no wheezes rhonchi or crackles  CV:          Regular rate and rhythm. No murmurs, rubs or gallops.  Abd:         Soft non tender, non distended. Normal active bowel sounds.  No  Hepatosplenomegaly, No pulsatile masses.                   Ext:          No clubbing, cyanosis, edema.      Assessment and Plan.   52 y.o. female with the following issues.    1. Abdominal discomfort in right upper quadrant  Continue to monitor, symptoms resolving    2. Elevated liver function tests  Stable  Continue abstaining from alcohol    3. Essential hypertension  Repeat blood pressure 160/92. Patient wishes to continue Inderal as prescribed 3 times a day.  Has a prescription for lisinopril 20 mg tablet daily if blood pressure remains above 150/90 consistently or is symptomatic.    4. Acquired hypothyroidism  Patient would like to approach her hypothyroidism from a holistic standpoint through nutrition and supplementation.  Patient is " asymptomatic at this time  Recommend to recheck thyroid function panel within 3-6 months            Please note that this dictation was created using voice recognition software. I have made every reasonable attempt to correct obvious errors, but expect that there are errors of grammar and possible content that I did not discover before finalizing note.

## 2018-03-21 ENCOUNTER — TELEMEDICINE ORIGINATING SITE VISIT (OUTPATIENT)
Dept: MEDICAL GROUP | Facility: CLINIC | Age: 53
End: 2018-03-21
Payer: MEDICAID

## 2018-03-21 ENCOUNTER — TELEMEDICINE2 (OUTPATIENT)
Dept: MEDICAL GROUP | Age: 53
End: 2018-03-21
Payer: MEDICAID

## 2018-03-21 VITALS
DIASTOLIC BLOOD PRESSURE: 85 MMHG | HEIGHT: 62 IN | SYSTOLIC BLOOD PRESSURE: 159 MMHG | TEMPERATURE: 98.2 F | HEART RATE: 99 BPM | OXYGEN SATURATION: 95 % | WEIGHT: 122 LBS | BODY MASS INDEX: 22.45 KG/M2 | RESPIRATION RATE: 16 BRPM

## 2018-03-21 DIAGNOSIS — R10.11 ABDOMINAL DISCOMFORT IN RIGHT UPPER QUADRANT: ICD-10-CM

## 2018-03-21 DIAGNOSIS — E03.9 ACQUIRED HYPOTHYROIDISM: ICD-10-CM

## 2018-03-21 DIAGNOSIS — I10 ESSENTIAL HYPERTENSION: ICD-10-CM

## 2018-03-21 PROCEDURE — 99214 OFFICE O/P EST MOD 30 MIN: CPT | Performed by: INTERNAL MEDICINE

## 2018-03-21 RX ORDER — LEVOTHYROXINE SODIUM 0.03 MG/1
25 TABLET ORAL
Qty: 30 TAB | Refills: 5 | Status: SHIPPED | OUTPATIENT
Start: 2018-03-21 | End: 2018-10-24 | Stop reason: SDUPTHER

## 2018-03-21 NOTE — PROGRESS NOTES
"MMSE    -What is the:  Year, season, date, day, month.               ***/5 points    -Where are we:   State, county, town, hospital, floor.      ***/5 points    -3 objects immediate recall.                                             ***/3 points    -World backwards or serial sevens.                                 ***/5 points    -Three-minute recall.                                                         ***/3 points    -Name 2 objects.                                                               ***/2 points    -Repeat the following.      \"No ifs and is or buts\"                                                    ***/1 point    -Follow a 3 stage command.      Paper right hand, fold in half, place on floor.                 ***/3 points    -Follow a written command.       Close your eyes                                                          ***/1 point    -Write a complete sentence.                                           ***/1 point    -Copy a design.                                                                ***/1 point    Total                                                                                  ***/30    Cutoff of 24 indicating dementia.      "

## 2018-03-21 NOTE — PROGRESS NOTES
"CC: f/u hypertension    Verified identification with patient. Secured video conference with RN presenter in [facility]      HPI:   Qian presents today with the following.    1. Essential hypertension  Taking inderal 10 mg TID. Did not start Lisinopril. /80 average. Symptom of fatigue and lightheaded.    2. Acquired hypothyroidism  TSH 5.8. No thyroid supplement. Would like to start levothyroxine.     3. Abdominal discomfort in right upper quadrant  Abdominal US and labs wnl. Sx of right rib pain with coughing. Stopped smoking 2 months ago and has cough with white sputum. Using nicoderm patches.       Patient Active Problem List    Diagnosis Date Noted   • Elevated liver function tests 02/15/2018   • Acquired hypothyroidism 02/15/2018   • Bronchitis 01/10/2018   • Tobacco abuse 01/10/2018   • Abnormal CBC 03/01/2017   • Abdominal discomfort in right upper quadrant 02/08/2017   • HTN (hypertension) 06/09/2016   • Anxiety 06/09/2016   • Depression 06/09/2016       Current Outpatient Prescriptions   Medication Sig Dispense Refill   • levothyroxine (SYNTHROID) 25 MCG Tab Take 1 Tab by mouth Every morning on an empty stomach. 30 Tab 5   • lisinopril (PRINIVIL) 20 MG Tab      • lisinopril (PRINIVIL) 20 MG Tab      • azithromycin (ZITHROMAX) 250 MG Tab Take 2 tablets on day one, then one tablet daily 6 Tab 0   • propranolol (INDERAL) 10 MG Tab Take 1 Tab by mouth 3 times a day. 90 Tab 8   • buPROPion (WELLBUTRIN XL) 300 MG XL tablet Take 1 Tab by mouth every morning. 30 Tab 8   • buPROPion (WELLBUTRIN XL) 150 MG XL tablet Take 1 Tab by mouth every morning. 30 Tab 3     No current facility-administered medications for this visit.          Allergies as of 03/21/2018   • (No Known Allergies)        ROS: As per HPI.denies all other cardiopulmonary, GI , neurologic sx    /85   Pulse 99   Temp 36.8 °C (98.2 °F)   Resp 16   Ht 1.575 m (5' 2\")   Wt 55.3 kg (122 lb)   SpO2 95%   BMI 22.31 kg/m²     Physical " Exam:  Gen:         Alert and oriented, No apparent distress.  Neck:        No Lymphadenopathy or Bruits.  Lungs:     Clear to auscultation bilaterally, no wheezes ronchi or crackles  CV:          Regular rate and rhythm. No murmurs, rubs or gallops.  Abd:         Soft non tender, non distended. Normal active bowel sounds.  No  Hepatosplenomegaly, No pulsatile masses.                   Ext:          No clubbing, cyanosis, edema.  MSK;        Mild TTP to right rib cage, no deformaties noted    Assessment and Plan.   52 y.o. female with the following issues.    1. Essential hypertension  Continue with inderal 10 mg TID.   Half tab lisinipril (10 mg) if BP greater than 150/90 consistently    2. Acquired hypothyroidism    - levothyroxine (SYNTHROID) 25 MCG Tab; Take 1 Tab by mouth Every morning on an empty stomach.  Dispense: 30 Tab; Refill: 5  - THYROID PANEL WITH TSH    3. Abdominal discomfort in right upper quadrant  OTC musinex  If sx don't resolve, CXR, Rt. Rib series            Please note that this dictation was created using voice recognition software. I have made every reasonable attempt to correct obvious errors, but expect that there are errors of grammar and possible content that I did not discover before finalizing note.

## 2018-06-27 ENCOUNTER — TELEMEDICINE ORIGINATING SITE VISIT (OUTPATIENT)
Dept: MEDICAL GROUP | Facility: CLINIC | Age: 53
End: 2018-06-27
Payer: MEDICAID

## 2018-06-27 ENCOUNTER — TELEMEDICINE2 (OUTPATIENT)
Dept: MEDICAL GROUP | Age: 53
End: 2018-06-27
Payer: MEDICAID

## 2018-06-27 VITALS
HEIGHT: 62 IN | HEART RATE: 67 BPM | TEMPERATURE: 98.8 F | RESPIRATION RATE: 16 BRPM | DIASTOLIC BLOOD PRESSURE: 87 MMHG | BODY MASS INDEX: 22.82 KG/M2 | WEIGHT: 124 LBS | SYSTOLIC BLOOD PRESSURE: 173 MMHG | OXYGEN SATURATION: 100 %

## 2018-06-27 DIAGNOSIS — E03.9 ACQUIRED HYPOTHYROIDISM: ICD-10-CM

## 2018-06-27 DIAGNOSIS — F32.A DEPRESSION, UNSPECIFIED DEPRESSION TYPE: ICD-10-CM

## 2018-06-27 DIAGNOSIS — I10 ESSENTIAL HYPERTENSION: ICD-10-CM

## 2018-06-27 DIAGNOSIS — F32.89 OTHER DEPRESSION: ICD-10-CM

## 2018-06-27 PROCEDURE — 99214 OFFICE O/P EST MOD 30 MIN: CPT | Performed by: INTERNAL MEDICINE

## 2018-06-27 RX ORDER — LISINOPRIL 20 MG/1
20 TABLET ORAL DAILY
Qty: 30 TAB | Refills: 5 | Status: SHIPPED | OUTPATIENT
Start: 2018-06-27 | End: 2019-01-07 | Stop reason: SDUPTHER

## 2018-06-27 RX ORDER — BUPROPION HYDROCHLORIDE 300 MG/1
300 TABLET ORAL EVERY MORNING
Qty: 30 TAB | Refills: 8 | Status: SHIPPED | OUTPATIENT
Start: 2018-06-27 | End: 2019-04-08 | Stop reason: SDUPTHER

## 2018-06-27 NOTE — PROGRESS NOTES
CC: Medication refills    Verified identification with patient. Secured video conference with RN presenter in [facility]      HPI:   Qian presents today with the following.    1. Other depression  Patient requesting refill on her Wellbutrin  mg tablets. Patient doing well, medication controlling anxiety and depression.    2. Essential hypertension  Patient taking propranolol 10 mg by mouth 3 times a day. Patient has not been taking her lisinopril and has noted blood pressures averaging in the 150-160/90 range.    3. Acquired hypothyroidism  Patient is taking Synthroid 25 MCG is every other day. Her hair and skin has improved and is noticing less fatigue. She is taking medications every other day secondary to possible side effects of bloating and distention. TSH 5.8 in severity 2018. No new recent labs        Patient Active Problem List    Diagnosis Date Noted   • Elevated liver function tests 02/15/2018   • Acquired hypothyroidism 02/15/2018   • Bronchitis 01/10/2018   • Tobacco abuse 01/10/2018   • Abnormal CBC 03/01/2017   • Abdominal discomfort in right upper quadrant 02/08/2017   • HTN (hypertension) 06/09/2016   • Anxiety 06/09/2016   • Depression 06/09/2016       Current Outpatient Prescriptions   Medication Sig Dispense Refill   • lisinopril (PRINIVIL) 20 MG Tab Take 1 Tab by mouth every day. 30 Tab 5   • buPROPion (WELLBUTRIN XL) 300 MG XL tablet Take 1 Tab by mouth every morning. 30 Tab 8   • levothyroxine (SYNTHROID) 25 MCG Tab Take 1 Tab by mouth Every morning on an empty stomach. 30 Tab 5   • lisinopril (PRINIVIL) 20 MG Tab      • azithromycin (ZITHROMAX) 250 MG Tab Take 2 tablets on day one, then one tablet daily 6 Tab 0   • propranolol (INDERAL) 10 MG Tab Take 1 Tab by mouth 3 times a day. 90 Tab 8   • buPROPion (WELLBUTRIN XL) 150 MG XL tablet Take 1 Tab by mouth every morning. 30 Tab 3     No current facility-administered medications for this visit.          Allergies as of 06/27/2018   •  "(No Known Allergies)        ROS: As per HPI. Denies all other cardiopulmonary, GI, neurologic symptoms.    BP (!) 173/87   Pulse 67   Temp 37.1 °C (98.8 °F)   Resp 16   Ht 1.575 m (5' 2\")   Wt 56.2 kg (124 lb)   SpO2 100%   BMI 22.68 kg/m²     Physical Exam:  Gen:         Alert and oriented, No apparent distress.  Neck:        No Lymphadenopathy or Bruits. No thyromegaly. Neck is supple.  Lungs:     Clear to auscultation bilaterally, no wheezes rhonchi or crackles  CV:          Regular rate and rhythm. No murmurs, rubs or gallops.  Abd:         Soft non tender, non distended. Normal active bowel sounds.  No  Hepatosplenomegaly, No pulsatile masses.                   Ext:          No clubbing, cyanosis, edema.      Assessment and Plan.   52 y.o. female with the following issues.      1. Essential hypertension  Continue propranolol 10 mg 3 times a day  Patient to start lisinopril 20 mg tablet, half a tablet daily ×2 weeks. Monitor blood pressure and increased to 20 mg daily if blood pressure remains elevated  RTC 3-4 weeks    - lisinopril (PRINIVIL) 20 MG Tab; Take 1 Tab by mouth every day.  Dispense: 30 Tab; Refill: 5    2. Acquired hypothyroidism  Recheck thyroid function panel    3. Depression, unspecified depression type  Stable    - buPROPion (WELLBUTRIN XL) 300 MG XL tablet; Take 1 Tab by mouth every morning.  Dispense: 30 Tab; Refill: 8            Please note that this dictation was created using voice recognition software. I have made every reasonable attempt to correct obvious errors, but expect that there are errors of grammar and possible content that I did not discover before finalizing note.   "

## 2018-10-24 DIAGNOSIS — E03.9 ACQUIRED HYPOTHYROIDISM: ICD-10-CM

## 2018-10-24 NOTE — TELEPHONE ENCOUNTER
Was the patient seen in the last year in this department? Yes    Does patient have an active prescription for medications requested? Yes    Received Request Via: Pharmacy     Requested Prescriptions     Pending Prescriptions Disp Refills   • levothyroxine (SYNTHROID) 25 MCG Tab 30 Tab 5     Sig: Take 1 Tab by mouth Every morning on an empty stomach.

## 2018-10-25 RX ORDER — LEVOTHYROXINE SODIUM 0.03 MG/1
25 TABLET ORAL
Qty: 30 TAB | Refills: 5 | Status: SHIPPED
Start: 2018-10-25 | End: 2019-12-24

## 2018-11-01 ENCOUNTER — NON-PROVIDER VISIT (OUTPATIENT)
Dept: MEDICAL GROUP | Facility: CLINIC | Age: 53
End: 2018-11-01
Payer: MEDICAID

## 2018-11-01 DIAGNOSIS — E03.9 ACQUIRED HYPOTHYROIDISM: ICD-10-CM

## 2018-11-01 PROCEDURE — 36415 COLL VENOUS BLD VENIPUNCTURE: CPT | Performed by: INTERNAL MEDICINE

## 2018-11-01 NOTE — NON-PROVIDER
Qian Rodarte is a 53 y.o. female here for a non-provider visit for Venipuncture    If abnormal was an in office provider notified upon receipt of results (if so, indicate provider)? Yes  Routed to PCP? Yes

## 2018-11-02 LAB
FT4I SERPL CALC-MCNC: 1.8 (ref 1.2–4.9)
T3RU NFR SERPL: 26 % (ref 24–39)
T4 SERPL-MCNC: 7.1 UG/DL (ref 4.5–12)
TSH SERPL DL<=0.005 MIU/L-ACNC: 19.67 UIU/ML (ref 0.45–4.5)

## 2018-11-05 ENCOUNTER — TELEMEDICINE ORIGINATING SITE VISIT (OUTPATIENT)
Dept: MEDICAL GROUP | Facility: CLINIC | Age: 53
End: 2018-11-05
Payer: MEDICAID

## 2018-11-05 ENCOUNTER — TELEMEDICINE2 (OUTPATIENT)
Dept: MEDICAL GROUP | Age: 53
End: 2018-11-05
Payer: MEDICAID

## 2018-11-05 VITALS
TEMPERATURE: 97 F | HEART RATE: 97 BPM | BODY MASS INDEX: 23.37 KG/M2 | WEIGHT: 127 LBS | DIASTOLIC BLOOD PRESSURE: 83 MMHG | SYSTOLIC BLOOD PRESSURE: 140 MMHG | HEIGHT: 62 IN

## 2018-11-05 DIAGNOSIS — F32.0 CURRENT MILD EPISODE OF MAJOR DEPRESSIVE DISORDER, UNSPECIFIED WHETHER RECURRENT (HCC): ICD-10-CM

## 2018-11-05 DIAGNOSIS — I10 ESSENTIAL HYPERTENSION: ICD-10-CM

## 2018-11-05 DIAGNOSIS — E03.9 ACQUIRED HYPOTHYROIDISM: ICD-10-CM

## 2018-11-05 PROCEDURE — 99213 OFFICE O/P EST LOW 20 MIN: CPT | Performed by: INTERNAL MEDICINE

## 2018-11-05 RX ORDER — LEVOTHYROXINE SODIUM 0.05 MG/1
50 TABLET ORAL
Qty: 30 TAB | Refills: 5 | Status: SHIPPED
Start: 2018-11-05 | End: 2019-12-24

## 2018-11-05 NOTE — PROGRESS NOTES
CC: Follow-up lab work    Verified identification with patient. Secured video conference with RN presenter in [facility]      HPI:   Qian presents today with the following.    1. Acquired hypothyroidism  Patient currently taking Synthroid 25 MCG's daily.  Current TSH is 19.6, up from 5.8 in January.  Patient complains of fatigue, tiredness, mild depression, achiness, hair and nail changes.    2. Essential hypertension  Patient doing well on previously prescribed lisinopril 20 mg daily.  Blood pressure runs in the 130s over 70s at home.  Patient has been tapering off of her propranolol 10 mg tablet down to 1 daily.  Beta-blocker made her feel more sluggish.    3. Current mild episode of major depressive disorder, unspecified whether recurrent (HCC)  Patient doing well with Wellbutrin  mg daily.  Mood has been better and more stable.      Patient Active Problem List    Diagnosis Date Noted   • Elevated liver function tests 02/15/2018   • Acquired hypothyroidism 02/15/2018   • Bronchitis 01/10/2018   • Tobacco abuse 01/10/2018   • Abnormal CBC 03/01/2017   • Abdominal discomfort in right upper quadrant 02/08/2017   • HTN (hypertension) 06/09/2016   • Anxiety 06/09/2016   • Depression 06/09/2016       Current Outpatient Prescriptions   Medication Sig Dispense Refill   • levothyroxine (SYNTHROID) 50 MCG Tab Take 1 Tab by mouth Every morning on an empty stomach. 30 Tab 5   • levothyroxine (SYNTHROID) 25 MCG Tab Take 1 Tab by mouth Every morning on an empty stomach. 30 Tab 5   • lisinopril (PRINIVIL) 20 MG Tab Take 1 Tab by mouth every day. 30 Tab 5   • buPROPion (WELLBUTRIN XL) 300 MG XL tablet Take 1 Tab by mouth every morning. 30 Tab 8   • propranolol (INDERAL) 10 MG Tab Take 1 Tab by mouth 3 times a day. (Patient taking differently: Take 10 mg by mouth 1 time daily as needed.) 90 Tab 8   • lisinopril (PRINIVIL) 20 MG Tab      • azithromycin (ZITHROMAX) 250 MG Tab Take 2 tablets on day one, then one tablet  "daily 6 Tab 0   • buPROPion (WELLBUTRIN XL) 150 MG XL tablet Take 1 Tab by mouth every morning. 30 Tab 3     No current facility-administered medications for this visit.          Allergies as of 11/05/2018   • (No Known Allergies)        ROS: As per HPI.  Denies all other cardiopulmonary, GI, neurologic symptoms.    /83 (BP Location: Right arm, Patient Position: Sitting)   Pulse 97   Temp 36.1 °C (97 °F)   Ht 1.575 m (5' 2\")   Wt 57.6 kg (127 lb)   BMI 23.23 kg/m²     Physical Exam:  Gen:         Alert and oriented, No apparent distress.  Neck:        No Lymphadenopathy or Bruits.  No thyromegaly  Lungs:     Clear to auscultation bilaterally, no wheezes rhonchi or crackles  CV:          Regular rate and rhythm. No murmurs, rubs or gallops.  Abd:         Soft non tender, non distended.               Ext:          No clubbing, cyanosis, edema.      Assessment and Plan.   53 y.o. female with the following issues.    1. Acquired hypothyroidism  Increase levothyroxine to 50 MCG's daily  Recheck thyroid function panel in 2-3 months    - THYROID PANEL WITH TSH  - levothyroxine (SYNTHROID) 50 MCG Tab; Take 1 Tab by mouth Every morning on an empty stomach.  Dispense: 30 Tab; Refill: 5    2. Essential hypertension  Patient may continue tapering off her propranolol  Monitor blood pressure at home  Continue lisinopril 20 mg daily    3. Current mild episode of major depressive disorder, unspecified whether recurrent (HCC)  Stable with Wellbutrin, continue with current dose            Please note that this dictation was created using voice recognition software. I have made every reasonable attempt to correct obvious errors, but expect that there are errors of grammar and possible content that I did not discover before finalizing note.   "

## 2019-01-07 DIAGNOSIS — I10 ESSENTIAL HYPERTENSION: ICD-10-CM

## 2019-01-07 RX ORDER — LISINOPRIL 20 MG/1
20 TABLET ORAL DAILY
Qty: 30 TAB | Refills: 5 | Status: SHIPPED | OUTPATIENT
Start: 2019-01-07 | End: 2019-07-10 | Stop reason: SDUPTHER

## 2019-01-07 RX ORDER — PROPRANOLOL HYDROCHLORIDE 10 MG/1
10 TABLET ORAL 3 TIMES DAILY
Qty: 90 TAB | Refills: 8 | Status: SHIPPED | OUTPATIENT
Start: 2019-01-07 | End: 2020-10-22 | Stop reason: SDUPTHER

## 2019-02-20 ENCOUNTER — NON-PROVIDER VISIT (OUTPATIENT)
Dept: MEDICAL GROUP | Facility: CLINIC | Age: 54
End: 2019-02-20
Payer: MEDICAID

## 2019-02-20 DIAGNOSIS — E03.9 ACQUIRED HYPOTHYROIDISM: ICD-10-CM

## 2019-02-20 PROCEDURE — 36415 COLL VENOUS BLD VENIPUNCTURE: CPT | Performed by: INTERNAL MEDICINE

## 2019-02-20 NOTE — NON-PROVIDER
Qian Rodarte is a 53 y.o. female here for a non-provider visit for venipuncture.    If abnormal was an in office provider notified today (if so, indicate provider)? Yes  Routed to PCP? Yes

## 2019-03-01 ENCOUNTER — TELEMEDICINE ORIGINATING SITE VISIT (OUTPATIENT)
Dept: MEDICAL GROUP | Facility: CLINIC | Age: 54
End: 2019-03-01
Payer: MEDICAID

## 2019-03-01 ENCOUNTER — TELEMEDICINE2 (OUTPATIENT)
Dept: MEDICAL GROUP | Age: 54
End: 2019-03-01
Payer: MEDICAID

## 2019-03-01 VITALS
HEIGHT: 62 IN | OXYGEN SATURATION: 100 % | WEIGHT: 132 LBS | DIASTOLIC BLOOD PRESSURE: 84 MMHG | TEMPERATURE: 98.3 F | SYSTOLIC BLOOD PRESSURE: 132 MMHG | HEART RATE: 82 BPM | BODY MASS INDEX: 24.29 KG/M2

## 2019-03-01 DIAGNOSIS — E03.9 ACQUIRED HYPOTHYROIDISM: ICD-10-CM

## 2019-03-01 DIAGNOSIS — I10 ESSENTIAL HYPERTENSION: ICD-10-CM

## 2019-03-01 DIAGNOSIS — F32.0 CURRENT MILD EPISODE OF MAJOR DEPRESSIVE DISORDER, UNSPECIFIED WHETHER RECURRENT (HCC): ICD-10-CM

## 2019-03-01 PROCEDURE — 99214 OFFICE O/P EST MOD 30 MIN: CPT | Performed by: INTERNAL MEDICINE

## 2019-03-01 RX ORDER — LEVOTHYROXINE SODIUM 0.1 MG/1
100 TABLET ORAL
Qty: 30 TAB | Refills: 5 | Status: SHIPPED | OUTPATIENT
Start: 2019-03-01 | End: 2019-09-30 | Stop reason: SDUPTHER

## 2019-03-04 NOTE — PROGRESS NOTES
CC: Follow-up lab work    Verified identification with patient. Secured video conference with RN presenter in [facility]      HPI:   Qian presents today with the following.    1. Acquired hypothyroidism  Patient increase her levothyroxine to 50 MCG's daily on last visit, 3 months ago.  Current TSH level 13.37, down from 19.6.  Patient has noticed that her fatigue and depression has increased over the last couple of months.    2. Essential hypertension  Patient is doing well with increased dose of lisinopril to 20 mg daily.  Patient has tapered off her beta-blocker.  Tolerating blood pressure medications well.  Does not often check her blood pressure readings.    3. Current mild episode of major depressive disorder, unspecified whether recurrent (HCC)  Patient has noticed an increase in mood lability, agitation, depression and achiness.  In general, patient doing well on her Wellbutrin without any side effects and would like to continue this medication.    Patient Active Problem List    Diagnosis Date Noted   • Elevated liver function tests 02/15/2018   • Acquired hypothyroidism 02/15/2018   • Bronchitis 01/10/2018   • Tobacco abuse 01/10/2018   • Abnormal CBC 03/01/2017   • Abdominal discomfort in right upper quadrant 02/08/2017   • HTN (hypertension) 06/09/2016   • Anxiety 06/09/2016   • Depression 06/09/2016       Current Outpatient Prescriptions   Medication Sig Dispense Refill   • levothyroxine (SYNTHROID) 100 MCG Tab Take 1 Tab by mouth Every morning on an empty stomach. 30 Tab 5   • lisinopril (PRINIVIL) 20 MG Tab Take 1 Tab by mouth every day. 30 Tab 5   • levothyroxine (SYNTHROID) 50 MCG Tab Take 1 Tab by mouth Every morning on an empty stomach. 30 Tab 5   • buPROPion (WELLBUTRIN XL) 300 MG XL tablet Take 1 Tab by mouth every morning. 30 Tab 8   • propranolol (INDERAL) 10 MG Tab Take 1 Tab by mouth 3 times a day. (Patient not taking: Reported on 3/1/2019) 90 Tab 8   • levothyroxine (SYNTHROID) 25 MCG  "Tab Take 1 Tab by mouth Every morning on an empty stomach. 30 Tab 5   • lisinopril (PRINIVIL) 20 MG Tab      • azithromycin (ZITHROMAX) 250 MG Tab Take 2 tablets on day one, then one tablet daily 6 Tab 0   • buPROPion (WELLBUTRIN XL) 150 MG XL tablet Take 1 Tab by mouth every morning. 30 Tab 3     No current facility-administered medications for this visit.          Allergies as of 03/01/2019   • (No Known Allergies)        ROS: As per HPI.  Denies all cardiopulmonary, GI, neurologic symptoms.    /84 (BP Location: Right arm, Patient Position: Sitting)   Pulse 82   Temp 36.8 °C (98.3 °F) (Tympanic)   Ht 1.575 m (5' 2\")   Wt 59.9 kg (132 lb)   SpO2 100%   BMI 24.14 kg/m²     Physical Exam:  Gen:         Alert and oriented, No apparent distress.  Neck:        No Lymphadenopathy or Bruits.  No thyromegaly  Lungs:     Clear to auscultation bilaterally, no wheezes rhonchi or crackles  CV:          Regular rate and rhythm. No murmurs, rubs or gallops.  Abd:         Soft non tender, non distended. Normal active bowel sounds.  No  Hepatosplenomegaly, No pulsatile masses.                   Ext:          No clubbing, cyanosis, edema.      Assessment and Plan.   53 y.o. female with the following issues.    1. Acquired hypothyroidism  Increase levothyroxine to 100 MCG's daily  Recheck thyroid function panel in 2-3 months    - THYROID PANEL WITH TSH  - levothyroxine (SYNTHROID) 100 MCG Tab; Take 1 Tab by mouth Every morning on an empty stomach.  Dispense: 30 Tab; Refill: 5    2. Essential hypertension  Continue lisinopril at current dose  Blood pressure stable    3. Current mild episode of major depressive disorder, unspecified whether recurrent (HCC)  Continue Wellbutrin  mg daily  Adjust levothyroxine  Offered counseling for depression  Follow-up in 2-3 months            Please note that this dictation was created using voice recognition software. I have made every reasonable attempt to correct obvious errors, " but expect that there are errors of grammar and possible content that I did not discover before finalizing note.

## 2019-04-08 DIAGNOSIS — F32.A DEPRESSION, UNSPECIFIED DEPRESSION TYPE: ICD-10-CM

## 2019-04-08 RX ORDER — BUPROPION HYDROCHLORIDE 300 MG/1
300 TABLET ORAL EVERY MORNING
Qty: 30 TAB | Refills: 8 | Status: SHIPPED
Start: 2019-04-08 | End: 2019-12-24

## 2019-07-10 DIAGNOSIS — I10 ESSENTIAL HYPERTENSION: ICD-10-CM

## 2019-07-10 RX ORDER — LISINOPRIL 20 MG/1
20 TABLET ORAL DAILY
Qty: 30 TAB | Refills: 5 | Status: SHIPPED | OUTPATIENT
Start: 2019-07-10 | End: 2019-12-31 | Stop reason: SDUPTHER

## 2019-07-10 NOTE — TELEPHONE ENCOUNTER
Was the patient seen in the last year in this department? Yes    Does patient have an active prescription for medications requested? Yes    Received Request Via: Pharmacy     Requested Prescriptions     Pending Prescriptions Disp Refills   • lisinopril (PRINIVIL) 20 MG Tab 30 Tab 5     Sig: Take 1 Tab by mouth every day.

## 2019-12-24 ENCOUNTER — OFFICE VISIT (OUTPATIENT)
Dept: MEDICAL GROUP | Facility: CLINIC | Age: 54
End: 2019-12-24
Payer: MEDICAID

## 2019-12-24 VITALS
HEIGHT: 62 IN | WEIGHT: 134 LBS | TEMPERATURE: 98.4 F | DIASTOLIC BLOOD PRESSURE: 94 MMHG | BODY MASS INDEX: 24.66 KG/M2 | HEART RATE: 86 BPM | RESPIRATION RATE: 16 BRPM | SYSTOLIC BLOOD PRESSURE: 161 MMHG | OXYGEN SATURATION: 97 %

## 2019-12-24 DIAGNOSIS — F41.9 ANXIETY: ICD-10-CM

## 2019-12-24 DIAGNOSIS — F32.0 CURRENT MILD EPISODE OF MAJOR DEPRESSIVE DISORDER, UNSPECIFIED WHETHER RECURRENT (HCC): ICD-10-CM

## 2019-12-24 DIAGNOSIS — E03.9 ACQUIRED HYPOTHYROIDISM: ICD-10-CM

## 2019-12-24 DIAGNOSIS — R10.13 EPIGASTRIC PAIN: ICD-10-CM

## 2019-12-24 PROCEDURE — 99214 OFFICE O/P EST MOD 30 MIN: CPT | Performed by: PHYSICIAN ASSISTANT

## 2019-12-24 RX ORDER — BUPROPION HYDROCHLORIDE 150 MG/1
TABLET ORAL
Qty: 90 TAB | Refills: 2 | Status: SHIPPED | OUTPATIENT
Start: 2019-12-24 | End: 2020-02-11 | Stop reason: SDUPTHER
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
## 2019-12-31 ENCOUNTER — OFFICE VISIT (OUTPATIENT)
Dept: MEDICAL GROUP | Facility: CLINIC | Age: 54
End: 2019-12-31
Payer: MEDICAID

## 2019-12-31 VITALS
SYSTOLIC BLOOD PRESSURE: 185 MMHG | BODY MASS INDEX: 23.92 KG/M2 | WEIGHT: 130 LBS | OXYGEN SATURATION: 96 % | RESPIRATION RATE: 16 BRPM | DIASTOLIC BLOOD PRESSURE: 112 MMHG | TEMPERATURE: 98.8 F | HEART RATE: 101 BPM | HEIGHT: 62 IN

## 2019-12-31 DIAGNOSIS — I10 ESSENTIAL HYPERTENSION: ICD-10-CM

## 2019-12-31 DIAGNOSIS — F32.0 CURRENT MILD EPISODE OF MAJOR DEPRESSIVE DISORDER, UNSPECIFIED WHETHER RECURRENT (HCC): ICD-10-CM

## 2019-12-31 PROCEDURE — 99213 OFFICE O/P EST LOW 20 MIN: CPT | Performed by: PHYSICIAN ASSISTANT

## 2019-12-31 RX ORDER — LISINOPRIL 20 MG/1
20 TABLET ORAL 2 TIMES DAILY
Qty: 60 TAB | Refills: 5 | Status: SHIPPED | OUTPATIENT
Start: 2019-12-31 | End: 2020-02-11 | Stop reason: SDUPTHER

## 2019-12-31 RX ORDER — BUPROPION HYDROCHLORIDE 150 MG/1
TABLET, EXTENDED RELEASE ORAL
COMMUNITY
Start: 2019-12-24 | End: 2019-12-31
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
## 2020-01-14 ENCOUNTER — OFFICE VISIT (OUTPATIENT)
Dept: MEDICAL GROUP | Facility: CLINIC | Age: 55
End: 2020-01-14
Payer: MEDICAID

## 2020-01-14 VITALS
TEMPERATURE: 98.2 F | WEIGHT: 135 LBS | DIASTOLIC BLOOD PRESSURE: 102 MMHG | SYSTOLIC BLOOD PRESSURE: 194 MMHG | BODY MASS INDEX: 24.84 KG/M2 | HEIGHT: 62 IN | HEART RATE: 100 BPM | OXYGEN SATURATION: 97 %

## 2020-01-14 DIAGNOSIS — E03.9 ACQUIRED HYPOTHYROIDISM: ICD-10-CM

## 2020-01-14 DIAGNOSIS — F41.9 ANXIETY: ICD-10-CM

## 2020-01-14 DIAGNOSIS — I10 ESSENTIAL HYPERTENSION: ICD-10-CM

## 2020-01-14 DIAGNOSIS — F32.0 CURRENT MILD EPISODE OF MAJOR DEPRESSIVE DISORDER, UNSPECIFIED WHETHER RECURRENT (HCC): ICD-10-CM

## 2020-01-14 PROBLEM — J40 BRONCHITIS: Status: RESOLVED | Noted: 2018-01-10 | Resolved: 2020-01-14

## 2020-01-14 PROCEDURE — 99213 OFFICE O/P EST LOW 20 MIN: CPT | Performed by: PHYSICIAN ASSISTANT
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
## 2020-02-11 ENCOUNTER — OFFICE VISIT (OUTPATIENT)
Dept: MEDICAL GROUP | Facility: CLINIC | Age: 55
End: 2020-02-11
Payer: MEDICAID

## 2020-02-11 VITALS
DIASTOLIC BLOOD PRESSURE: 74 MMHG | TEMPERATURE: 98.9 F | OXYGEN SATURATION: 97 % | RESPIRATION RATE: 16 BRPM | WEIGHT: 134 LBS | SYSTOLIC BLOOD PRESSURE: 138 MMHG | HEIGHT: 62 IN | BODY MASS INDEX: 24.66 KG/M2 | HEART RATE: 78 BPM

## 2020-02-11 DIAGNOSIS — E03.9 ACQUIRED HYPOTHYROIDISM: ICD-10-CM

## 2020-02-11 DIAGNOSIS — F41.9 ANXIETY: ICD-10-CM

## 2020-02-11 DIAGNOSIS — F32.0 CURRENT MILD EPISODE OF MAJOR DEPRESSIVE DISORDER, UNSPECIFIED WHETHER RECURRENT (HCC): ICD-10-CM

## 2020-02-11 DIAGNOSIS — I10 ESSENTIAL HYPERTENSION: ICD-10-CM

## 2020-02-11 PROCEDURE — 99213 OFFICE O/P EST LOW 20 MIN: CPT | Mod: 25 | Performed by: PHYSICIAN ASSISTANT

## 2020-02-11 PROCEDURE — 36415 COLL VENOUS BLD VENIPUNCTURE: CPT | Performed by: PHYSICIAN ASSISTANT

## 2020-02-11 RX ORDER — LEVOTHYROXINE SODIUM 0.1 MG/1
100 TABLET ORAL
Qty: 30 TAB | Refills: 6 | Status: SHIPPED | OUTPATIENT
Start: 2020-02-11 | End: 2020-05-18

## 2020-02-11 RX ORDER — LISINOPRIL 20 MG/1
20 TABLET ORAL 2 TIMES DAILY
Qty: 60 TAB | Refills: 5 | Status: SHIPPED | OUTPATIENT
Start: 2020-02-11 | End: 2020-10-22 | Stop reason: SDUPTHER

## 2020-02-11 RX ORDER — BUPROPION HYDROCHLORIDE 150 MG/1
TABLET ORAL
Qty: 90 TAB | Refills: 2 | Status: SHIPPED | OUTPATIENT
Start: 2020-02-11 | End: 2020-10-22 | Stop reason: SDUPTHER

## 2020-02-11 NOTE — PROGRESS NOTES
"cc:  Follow up    Subjective:     Qian Rodarte is a 54 y.o. female presenting for follow up      Patient presents to the office for follow up.  She is feeling much better.  She feels she is doing well on the medication.    She states that her neighbors have left also.  Overall her improvement is quite significant.  She has no complaints or issues at this time.  Blood pressure has also improved.  She will be due for thyroid check shortly so we will attempt to do that today.    Review of systems:  See above.   Denies any symptoms unless previously indicated.        Current Outpatient Medications:   •  buPROPion (WELLBUTRIN XL) 150 MG XL tablet, Take 3 tabs daily, Disp: 90 Tab, Rfl: 2  •  lisinopril (PRINIVIL) 20 MG Tab, Take 1 Tab by mouth 2 times a day., Disp: 60 Tab, Rfl: 5  •  levothyroxine (SYNTHROID) 100 MCG Tab, Take 1 Tab by mouth every morning before breakfast. TAKE ONE TABLET BY MOUTH EVERY MORNING ON EMPTY STOMACH, Disp: 30 Tab, Rfl: 6  •  propranolol (INDERAL) 10 MG Tab, Take 1 Tab by mouth 3 times a day., Disp: 90 Tab, Rfl: 8    Allergies, past medical history, past surgical history, family history, social history reviewed and updated    Objective:     Vitals: /74 (BP Location: Left arm, Patient Position: Sitting, BP Cuff Size: Adult)   Pulse 78   Temp 37.2 °C (98.9 °F) (Temporal)   Resp 16   Ht 1.575 m (5' 2\")   Wt 60.8 kg (134 lb)   SpO2 97%   BMI 24.51 kg/m²   General: Alert, pleasant, NAD  EYES:   PERRL, EOMI, no icterus or pallor.  Conjunctivae and lids normal.   HENT:  Normocephalic.  External ears normal.  No nasal drainage present.   Neck supple.     Abdomen: Not obese  Skin: Warm, dry, no rashes.  Musculoskeletal: Gait is normal.  Moves all extremities well.    Neurological: No tremors, sensation grossly intact,CN2-12 intact.  Psych:  Affect/mood is normal, judgement is good, memory is intact, grooming is appropriate.    Assessment/Plan:     Qian was seen today for " follow-up.    Diagnoses and all orders for this visit:    Acquired hypothyroidism  -     TSH+FREE T4  -     levothyroxine (SYNTHROID) 100 MCG Tab; Take 1 Tab by mouth every morning before breakfast. TAKE ONE TABLET BY MOUTH EVERY MORNING ON EMPTY STOMACH    Medications refilled at this time.  Will obtain labs to make sure patient is on correct dose.  Otherwise she will follow-up with new PCP.    Anxiety  -     buPROPion (WELLBUTRIN XL) 150 MG XL tablet; Take 3 tabs daily  Current mild episode of major depressive disorder, unspecified whether recurrent (HCC)  -     buPROPion (WELLBUTRIN XL) 150 MG XL tablet; Take 3 tabs daily    Anxiety well controlled.  We will keep patient at her current dose.  Recommend follow-up in 3 to 6 months with new PCP.    Essential hypertension  -     lisinopril (PRINIVIL) 20 MG Tab; Take 1 Tab by mouth 2 times a day.  Controlled.  Continue current medication.          Return if symptoms worsen or fail to improve.    Please note that this dictation was created using voice recognition software. I have made every reasonable attempt to correct obvious errors, but expect that there are errors of grammar and possible content that I did not discover before finalizing note.

## 2020-05-19 ENCOUNTER — TELEPHONE (OUTPATIENT)
Dept: MEDICAL GROUP | Facility: PHYSICIAN GROUP | Age: 55
End: 2020-05-19

## 2020-10-22 ENCOUNTER — OFFICE VISIT (OUTPATIENT)
Dept: MEDICAL GROUP | Facility: CLINIC | Age: 55
End: 2020-10-22
Payer: MEDICAID

## 2020-10-22 VITALS
DIASTOLIC BLOOD PRESSURE: 82 MMHG | HEART RATE: 76 BPM | SYSTOLIC BLOOD PRESSURE: 138 MMHG | BODY MASS INDEX: 25.76 KG/M2 | HEIGHT: 62 IN | TEMPERATURE: 98.2 F | WEIGHT: 140 LBS | RESPIRATION RATE: 12 BRPM | OXYGEN SATURATION: 97 %

## 2020-10-22 DIAGNOSIS — R79.89 ELEVATED PLATELET COUNT: ICD-10-CM

## 2020-10-22 DIAGNOSIS — F32.0 CURRENT MILD EPISODE OF MAJOR DEPRESSIVE DISORDER, UNSPECIFIED WHETHER RECURRENT (HCC): ICD-10-CM

## 2020-10-22 DIAGNOSIS — Z23 NEED FOR VACCINATION: ICD-10-CM

## 2020-10-22 DIAGNOSIS — I10 ESSENTIAL HYPERTENSION: ICD-10-CM

## 2020-10-22 DIAGNOSIS — F41.9 ANXIETY: ICD-10-CM

## 2020-10-22 DIAGNOSIS — E03.9 ACQUIRED HYPOTHYROIDISM: ICD-10-CM

## 2020-10-22 PROCEDURE — 90686 IIV4 VACC NO PRSV 0.5 ML IM: CPT | Performed by: PHYSICIAN ASSISTANT

## 2020-10-22 PROCEDURE — 90471 IMMUNIZATION ADMIN: CPT | Performed by: PHYSICIAN ASSISTANT

## 2020-10-22 PROCEDURE — 99214 OFFICE O/P EST MOD 30 MIN: CPT | Mod: 25 | Performed by: PHYSICIAN ASSISTANT

## 2020-10-22 RX ORDER — BUPROPION HYDROCHLORIDE 150 MG/1
TABLET ORAL
Qty: 270 TAB | Refills: 2 | Status: SHIPPED | OUTPATIENT
Start: 2020-10-22

## 2020-10-22 RX ORDER — PROPRANOLOL HYDROCHLORIDE 10 MG/1
10 TABLET ORAL 3 TIMES DAILY
Qty: 270 TAB | Refills: 2 | Status: SHIPPED | OUTPATIENT
Start: 2020-10-22 | End: 2021-07-27

## 2020-10-22 RX ORDER — BUPROPION HYDROCHLORIDE 150 MG/1
TABLET ORAL
Qty: 90 TAB | Refills: 2 | Status: SHIPPED | OUTPATIENT
Start: 2020-10-22 | End: 2020-10-22 | Stop reason: SDUPTHER

## 2020-10-22 RX ORDER — LEVOTHYROXINE SODIUM 0.1 MG/1
TABLET ORAL
Qty: 90 TAB | Refills: 2 | Status: SHIPPED | OUTPATIENT
Start: 2020-10-22

## 2020-10-22 RX ORDER — LISINOPRIL 20 MG/1
20 TABLET ORAL 2 TIMES DAILY
Qty: 180 TAB | Refills: 2 | Status: SHIPPED | OUTPATIENT
Start: 2020-10-22

## 2020-10-22 ASSESSMENT — PATIENT HEALTH QUESTIONNAIRE - PHQ9
SUM OF ALL RESPONSES TO PHQ QUESTIONS 1-9: 11
9. THOUGHTS THAT YOU WOULD BE BETTER OFF DEAD, OR OF HURTING YOURSELF: NOT AT ALL
7. TROUBLE CONCENTRATING ON THINGS, SUCH AS READING THE NEWSPAPER OR WATCHING TELEVISION: NEARLY EVERY DAY
5. POOR APPETITE OR OVEREATING: MORE THAN HALF THE DAYS
SUM OF ALL RESPONSES TO PHQ9 QUESTIONS 1 AND 2: 0
8. MOVING OR SPEAKING SO SLOWLY THAT OTHER PEOPLE COULD HAVE NOTICED. OR THE OPPOSITE, BEING SO FIGETY OR RESTLESS THAT YOU HAVE BEEN MOVING AROUND A LOT MORE THAN USUAL: SEVERAL DAYS
3. TROUBLE FALLING OR STAYING ASLEEP OR SLEEPING TOO MUCH: MORE THAN HALF THE DAYS
6. FEELING BAD ABOUT YOURSELF - OR THAT YOU ARE A FAILURE OR HAVE LET YOURSELF OR YOUR FAMILY DOWN: SEVERAL DAYS
2. FEELING DOWN, DEPRESSED, IRRITABLE, OR HOPELESS: NOT AT ALL
4. FEELING TIRED OR HAVING LITTLE ENERGY: MORE THAN HALF THE DAYS
1. LITTLE INTEREST OR PLEASURE IN DOING THINGS: NOT AT ALL

## 2020-10-22 NOTE — PROGRESS NOTES
"cc:  Follow up medications    Subjective:     Qian Rodarte is a 55 y.o. female presenting for follow up medications      Patient presents to the office for follow up medications. Patient has been out of thyroid medication for a couple of months.  She has decreased the wellbutrin to try and make it stretch but would like to continue with her current dose.  She is also low on her blood pressure medication.  She did have labs done in [place] before being seen today. TSH is 11.41.  Platelets are 503.    Review of systems:  See above.   Denies any symptoms unless previously indicated.        Current Outpatient Medications:   •  levothyroxine (SYNTHROID) 100 MCG Tab, TAKE ONE TABLET BY MOUTH EVERY MORNING ON AN EMPTY STOMACH, Disp: 90 Tab, Rfl: 2  •  propranolol (INDERAL) 10 MG Tab, Take 1 Tab by mouth 3 times a day., Disp: 270 Tab, Rfl: 2  •  lisinopril (PRINIVIL) 20 MG Tab, Take 1 Tab by mouth 2 times a day., Disp: 180 Tab, Rfl: 2  •  buPROPion (WELLBUTRIN XL) 150 MG XL tablet, Take 3 tabs daily, Disp: 270 Tab, Rfl: 2    Allergies, past medical history, past surgical history, family history, social history reviewed and updated    Objective:     Vitals: /82 (BP Location: Left arm, Patient Position: Sitting, BP Cuff Size: Adult)   Pulse 76   Temp 36.8 °C (98.2 °F) (Temporal)   Resp 12   Ht 1.575 m (5' 2\")   Wt 63.5 kg (140 lb)   SpO2 97%   BMI 25.61 kg/m²   General: Alert, pleasant, NAD  EYES:   PERRL, EOMI, no icterus or pallor.  Conjunctivae and lids normal.   HENT:  Normocephalic.  External ears normal.   Neck supple.   Heart: Regular rate and rhythm.  S1 and S2 normal.  No murmurs appreciated.  Respiratory: Normal respiratory effort.  Clear to auscultation bilaterally.  Abdomen: Not obese  Skin: Warm, dry, no rashes.  Musculoskeletal: Gait is normal.  Moves all extremities well.    Extremities: normal range of motion all extremities.   Neurological: No tremors, sensation grossly intact,  CN2-12 " intact.  Psych:  Affect/mood is normal, judgement is good, memory is intact, grooming is appropriate.    Assessment/Plan:     Qian was seen today for hypertension and hypothyroidism.    Diagnoses and all orders for this visit:    Acquired hypothyroidism  -     levothyroxine (SYNTHROID) 100 MCG Tab; TAKE ONE TABLET BY MOUTH EVERY MORNING ON AN EMPTY STOMACH  -     Lipid Profile; Future  -     Comp Metabolic Panel; Future  -     TSH; Future  -     FREE THYROXINE; Future    Levels are elevated as she has been out of medication.,  We will go ahead and refill her medication at this time and will repeat labs in approximately 6 to 12 weeks.    Essential hypertension  -     propranolol (INDERAL) 10 MG Tab; Take 1 Tab by mouth 3 times a day.  -     lisinopril (PRINIVIL) 20 MG Tab; Take 1 Tab by mouth 2 times a day.    Controlled.  Continue with medication.    Anxiety  -     Discontinue: buPROPion (WELLBUTRIN XL) 150 MG XL tablet; Take 3 tabs daily  -     buPROPion (WELLBUTRIN XL) 150 MG XL tablet; Take 3 tabs daily  Current mild episode of major depressive disorder, unspecified whether recurrent (HCC)  -     Discontinue: buPROPion (WELLBUTRIN XL) 150 MG XL tablet; Take 3 tabs daily  -     buPROPion (WELLBUTRIN XL) 150 MG XL tablet; Take 3 tabs daily    Patient is stable at this time but she would like to continue with her current doses.  Therefore we will go ahead and refill her medications at this time.    Elevated platelet count  -     CBC WITH DIFFERENTIAL; Future    Unclear as to why platelet count is elevated.  We will repeat this to rule out potential lab error.    Need for vaccination  -     Influenza Vaccine Quad Injection (PF)    Influenza vaccine given today.        Return in about 6 months (around 4/22/2021), or if symptoms worsen or fail to improve, for 6-9 months.    Please note that this dictation was created using voice recognition software. I have made every reasonable attempt to correct obvious errors, but  expect that there are errors of grammar and possible content that I did not discover before finalizing note.

## 2020-10-22 NOTE — PATIENT INSTRUCTIONS
Repeat labs in 8-12 weeks    Follow up visit in office in 6-9 months.      90 day refills given on medications.      Please send a [employer] message if you need anything.

## 2024-10-22 ENCOUNTER — OFFICE VISIT (OUTPATIENT)
Dept: URGENT CARE | Facility: PHYSICIAN GROUP | Age: 59
End: 2024-10-22
Payer: MEDICAID

## 2024-10-22 ENCOUNTER — HOSPITAL ENCOUNTER (OUTPATIENT)
Dept: LAB | Facility: MEDICAL CENTER | Age: 59
End: 2024-10-22
Attending: NURSE PRACTITIONER
Payer: MEDICAID

## 2024-10-22 VITALS
HEART RATE: 72 BPM | OXYGEN SATURATION: 95 % | TEMPERATURE: 97.6 F | SYSTOLIC BLOOD PRESSURE: 118 MMHG | RESPIRATION RATE: 14 BRPM | DIASTOLIC BLOOD PRESSURE: 72 MMHG

## 2024-10-22 DIAGNOSIS — E03.9 ACQUIRED HYPOTHYROIDISM: ICD-10-CM

## 2024-10-22 DIAGNOSIS — L08.9 SKIN INFECTION: ICD-10-CM

## 2024-10-22 DIAGNOSIS — I10 HYPERTENSION, UNSPECIFIED TYPE: ICD-10-CM

## 2024-10-22 LAB
ALBUMIN SERPL BCP-MCNC: 5.1 G/DL (ref 3.2–4.9)
ALBUMIN/GLOB SERPL: 1.9 G/DL
ALP SERPL-CCNC: 84 U/L (ref 30–99)
ALT SERPL-CCNC: 17 U/L (ref 2–50)
ANION GAP SERPL CALC-SCNC: 11 MMOL/L (ref 7–16)
AST SERPL-CCNC: 18 U/L (ref 12–45)
BILIRUB SERPL-MCNC: 0.3 MG/DL (ref 0.1–1.5)
BUN SERPL-MCNC: 17 MG/DL (ref 8–22)
CALCIUM ALBUM COR SERPL-MCNC: 9.8 MG/DL (ref 8.5–10.5)
CALCIUM SERPL-MCNC: 10.7 MG/DL (ref 8.5–10.5)
CHLORIDE SERPL-SCNC: 105 MMOL/L (ref 96–112)
CO2 SERPL-SCNC: 28 MMOL/L (ref 20–33)
CREAT SERPL-MCNC: 0.76 MG/DL (ref 0.5–1.4)
GFR SERPLBLD CREATININE-BSD FMLA CKD-EPI: 90 ML/MIN/1.73 M 2
GLOBULIN SER CALC-MCNC: 2.7 G/DL (ref 1.9–3.5)
GLUCOSE SERPL-MCNC: 86 MG/DL (ref 65–99)
POTASSIUM SERPL-SCNC: 4.5 MMOL/L (ref 3.6–5.5)
PROT SERPL-MCNC: 7.8 G/DL (ref 6–8.2)
SODIUM SERPL-SCNC: 144 MMOL/L (ref 135–145)

## 2024-10-22 PROCEDURE — 84443 ASSAY THYROID STIM HORMONE: CPT

## 2024-10-22 PROCEDURE — 36415 COLL VENOUS BLD VENIPUNCTURE: CPT

## 2024-10-22 PROCEDURE — 80053 COMPREHEN METABOLIC PANEL: CPT

## 2024-10-22 PROCEDURE — 3078F DIAST BP <80 MM HG: CPT | Performed by: NURSE PRACTITIONER

## 2024-10-22 PROCEDURE — 3074F SYST BP LT 130 MM HG: CPT | Performed by: NURSE PRACTITIONER

## 2024-10-22 PROCEDURE — 99203 OFFICE O/P NEW LOW 30 MIN: CPT | Performed by: NURSE PRACTITIONER

## 2024-10-22 RX ORDER — PROPRANOLOL HYDROCHLORIDE 10 MG/1
10 TABLET ORAL 3 TIMES DAILY
Qty: 90 TABLET | Refills: 2 | Status: SHIPPED | OUTPATIENT
Start: 2024-10-22

## 2024-10-22 RX ORDER — LISINOPRIL 20 MG/1
20 TABLET ORAL DAILY
Qty: 30 TABLET | Refills: 3 | Status: SHIPPED | OUTPATIENT
Start: 2024-10-22

## 2024-10-22 RX ORDER — LEVOTHYROXINE SODIUM 100 UG/1
100 TABLET ORAL
Qty: 30 TABLET | Refills: 2 | Status: SHIPPED | OUTPATIENT
Start: 2024-10-22

## 2024-10-22 RX ORDER — CEPHALEXIN 500 MG/1
500 CAPSULE ORAL 4 TIMES DAILY
Qty: 28 CAPSULE | Refills: 0 | Status: SHIPPED | OUTPATIENT
Start: 2024-10-22 | End: 2024-10-29

## 2024-10-22 ASSESSMENT — ENCOUNTER SYMPTOMS
CHILLS: 0
FEVER: 0
NAUSEA: 0
SENSORY CHANGE: 0
ORTHOPNEA: 0
MYALGIAS: 0
COUGH: 0
HEADACHES: 0
ABDOMINAL PAIN: 0
CONSTIPATION: 0
TINGLING: 0

## 2024-10-23 LAB — TSH SERPL-ACNC: 8.94 UIU/ML (ref 0.35–5.5)

## 2024-12-10 ENCOUNTER — OFFICE VISIT (OUTPATIENT)
Dept: URGENT CARE | Facility: PHYSICIAN GROUP | Age: 59
End: 2024-12-10
Payer: MEDICAID

## 2024-12-10 VITALS
BODY MASS INDEX: 21.58 KG/M2 | DIASTOLIC BLOOD PRESSURE: 90 MMHG | OXYGEN SATURATION: 98 % | TEMPERATURE: 97.7 F | HEART RATE: 75 BPM | SYSTOLIC BLOOD PRESSURE: 140 MMHG | RESPIRATION RATE: 16 BRPM | WEIGHT: 118 LBS

## 2024-12-10 DIAGNOSIS — R21 RASH OF FACE: ICD-10-CM

## 2024-12-10 PROCEDURE — 3080F DIAST BP >= 90 MM HG: CPT

## 2024-12-10 PROCEDURE — 3077F SYST BP >= 140 MM HG: CPT

## 2024-12-10 PROCEDURE — 99213 OFFICE O/P EST LOW 20 MIN: CPT

## 2024-12-10 RX ORDER — HYDROXYZINE PAMOATE 25 MG/1
25 CAPSULE ORAL EVERY 6 HOURS
COMMUNITY

## 2024-12-10 RX ORDER — TRIAMCINOLONE ACETONIDE 0.25 MG/G
1 CREAM TOPICAL 2 TIMES DAILY
Qty: 15 G | Refills: 0 | Status: SHIPPED | OUTPATIENT
Start: 2024-12-10 | End: 2024-12-17

## 2024-12-10 ASSESSMENT — ENCOUNTER SYMPTOMS: FEVER: 0

## 2024-12-11 ASSESSMENT — ENCOUNTER SYMPTOMS
DIARRHEA: 0
WHEEZING: 0
VOMITING: 0
COUGH: 0
CHILLS: 0
DIZZINESS: 0
ABDOMINAL PAIN: 0
PALPITATIONS: 0
SHORTNESS OF BREATH: 0
NAUSEA: 0
HEADACHES: 0
SORE THROAT: 0
WEAKNESS: 0

## 2024-12-11 NOTE — PROGRESS NOTES
Subjective     Qian Rodarte is a 59 y.o. female who presents with Rash (Redness around mouth still- thinks from zoloft. )            Rash  This is a new problem. The current episode started more than 1 month ago. The problem has been waxing and waning since onset. The affected locations include the face (perioral). The rash is characterized by redness, burning and pain. She was exposed to nothing. Pertinent negatives include no congestion, cough, diarrhea, facial edema, fever, shortness of breath, sore throat or vomiting. Treatments tried: 's previously prescribed corticosteroid for rash. The treatment provided moderate relief. There is no history of allergies, asthma, eczema or varicella.       Review of Systems   Constitutional:  Negative for chills and fever.   HENT:  Negative for congestion and sore throat.    Respiratory:  Negative for cough, shortness of breath and wheezing.    Cardiovascular:  Negative for chest pain and palpitations.   Gastrointestinal:  Negative for abdominal pain, diarrhea, nausea and vomiting.   Skin:  Positive for rash.   Neurological:  Negative for dizziness, weakness and headaches.              Objective     BP (!) 140/90   Pulse 75   Temp 36.5 °C (97.7 °F) (Temporal)   Resp 16   Wt 53.5 kg (118 lb)   SpO2 98%   BMI 21.58 kg/m²      Physical Exam  Constitutional:       General: She is not in acute distress.     Appearance: Normal appearance. She is not ill-appearing.   HENT:      Head: Normocephalic and atraumatic.      Right Ear: Tympanic membrane is not erythematous.      Left Ear: Tympanic membrane is not erythematous.      Nose: No congestion.      Mouth/Throat:      Mouth: Mucous membranes are moist.        Comments: Perioral erythematous, pruritic, burning nonpalpable rash.  No papules or pustules.  Rash is localized, not spreading down neck, trunk, or extremities  Eyes:      Extraocular Movements: Extraocular movements intact.      Conjunctiva/sclera: Conjunctivae  normal.      Pupils: Pupils are equal, round, and reactive to light.   Cardiovascular:      Rate and Rhythm: Normal rate and regular rhythm.   Pulmonary:      Effort: Pulmonary effort is normal. No respiratory distress.      Breath sounds: No stridor. No wheezing.   Musculoskeletal:         General: Normal range of motion.   Skin:     General: Skin is warm and dry.   Neurological:      General: No focal deficit present.      Mental Status: She is alert and oriented to person, place, and time. Mental status is at baseline.      Motor: No weakness.           Assessment & Plan     This is a subacute condition.  Qian is a pleasant 59-year-old female presenting to clinic today with complaints of perioral rash that is intermittently relieved, and then returns.  Patient is concerned that rash initially appeared 6 months ago after she began taking sertraline.  She states rash started as a small red cluster on the right lower side of her mouth, and has gotten progressively more pruritic, inflamed, and circumferential around her mouth.  Patient was seen in the clinic 10/22/2024 for symptoms of anxiety and treatment for similar rash.  Prescribed cephalexin 4 times daily for 7 days, for which patient finished course without resolve of rash.  Patient states she has been taking her 's Mometasone Furoate 0.1% cream for a rash he was previously prescribed, and patient states she has been taking cream when rash is extremely itchy and reports that redness and inflammation subsides.  She has not tried other creams, and is not taking oral steroids.  Assessment & Plan  Rash of face    Orders:    triamcinolone acetonide (KENALOG) 0.025 % Cream; Apply 1 Application topically 2 times a day for 7 days.       Given patient's current level of anxiety at this visit, and localization of perioral rash, risk of oral corticosteroid could exacerbate feelings of anxiety.  Patient requesting to try a prescribed topical corticosteroid, given  she has had relief in the past.  Patient instructed to take triamcinolone cream twice daily for 7 days.  She is to finish full course despite symptom relief.  I encouraged patient to notify PCP at appointment next month about possibility of drug reaction considering timing of rash onset after she started taking Sertraline.  Benefit of continuing prescription outweighs rash risk, patient agrees.    Encouraged patient that if symptoms worsen within 2 to 3 days of new topical prescription to return back to the clinic for further evaluation.  Patient also instructed that if she develops symptoms of chest pain or shortness of breath to go to the ER.    Patient understands and is agreeable to treatment plan.  Denies further questions.    Please note that this dictation was created using voice recognition software. I have made every reasonable attempt to correct obvious errors, but I expect that there are errors of grammar and possibly content that I did not discover before finalizing the note.

## 2025-01-29 SDOH — ECONOMIC STABILITY: INCOME INSECURITY: IN THE LAST 12 MONTHS, WAS THERE A TIME WHEN YOU WERE NOT ABLE TO PAY THE MORTGAGE OR RENT ON TIME?: NO

## 2025-01-29 SDOH — HEALTH STABILITY: PHYSICAL HEALTH: ON AVERAGE, HOW MANY DAYS PER WEEK DO YOU ENGAGE IN MODERATE TO STRENUOUS EXERCISE (LIKE A BRISK WALK)?: 5 DAYS

## 2025-01-29 SDOH — ECONOMIC STABILITY: FOOD INSECURITY: WITHIN THE PAST 12 MONTHS, THE FOOD YOU BOUGHT JUST DIDN'T LAST AND YOU DIDN'T HAVE MONEY TO GET MORE.: NEVER TRUE

## 2025-01-29 SDOH — ECONOMIC STABILITY: FOOD INSECURITY: WITHIN THE PAST 12 MONTHS, YOU WORRIED THAT YOUR FOOD WOULD RUN OUT BEFORE YOU GOT MONEY TO BUY MORE.: SOMETIMES TRUE

## 2025-01-29 SDOH — ECONOMIC STABILITY: INCOME INSECURITY: HOW HARD IS IT FOR YOU TO PAY FOR THE VERY BASICS LIKE FOOD, HOUSING, MEDICAL CARE, AND HEATING?: SOMEWHAT HARD

## 2025-01-29 SDOH — ECONOMIC STABILITY: TRANSPORTATION INSECURITY
IN THE PAST 12 MONTHS, HAS THE LACK OF TRANSPORTATION KEPT YOU FROM MEDICAL APPOINTMENTS OR FROM GETTING MEDICATIONS?: NO

## 2025-01-29 SDOH — ECONOMIC STABILITY: TRANSPORTATION INSECURITY
IN THE PAST 12 MONTHS, HAS LACK OF TRANSPORTATION KEPT YOU FROM MEETINGS, WORK, OR FROM GETTING THINGS NEEDED FOR DAILY LIVING?: NO

## 2025-01-29 SDOH — HEALTH STABILITY: MENTAL HEALTH
STRESS IS WHEN SOMEONE FEELS TENSE, NERVOUS, ANXIOUS, OR CAN'T SLEEP AT NIGHT BECAUSE THEIR MIND IS TROUBLED. HOW STRESSED ARE YOU?: VERY MUCH

## 2025-01-29 SDOH — HEALTH STABILITY: PHYSICAL HEALTH: ON AVERAGE, HOW MANY MINUTES DO YOU ENGAGE IN EXERCISE AT THIS LEVEL?: 50 MIN

## 2025-01-29 SDOH — ECONOMIC STABILITY: TRANSPORTATION INSECURITY
IN THE PAST 12 MONTHS, HAS LACK OF RELIABLE TRANSPORTATION KEPT YOU FROM MEDICAL APPOINTMENTS, MEETINGS, WORK OR FROM GETTING THINGS NEEDED FOR DAILY LIVING?: NO

## 2025-01-29 SDOH — ECONOMIC STABILITY: HOUSING INSECURITY
IN THE LAST 12 MONTHS, WAS THERE A TIME WHEN YOU DID NOT HAVE A STEADY PLACE TO SLEEP OR SLEPT IN A SHELTER (INCLUDING NOW)?: NO

## 2025-01-29 ASSESSMENT — SOCIAL DETERMINANTS OF HEALTH (SDOH)
HOW HARD IS IT FOR YOU TO PAY FOR THE VERY BASICS LIKE FOOD, HOUSING, MEDICAL CARE, AND HEATING?: SOMEWHAT HARD
WITHIN THE PAST 12 MONTHS, YOU WORRIED THAT YOUR FOOD WOULD RUN OUT BEFORE YOU GOT THE MONEY TO BUY MORE: SOMETIMES TRUE
DO YOU BELONG TO ANY CLUBS OR ORGANIZATIONS SUCH AS CHURCH GROUPS UNIONS, FRATERNAL OR ATHLETIC GROUPS, OR SCHOOL GROUPS?: NO
HOW OFTEN DO YOU GET TOGETHER WITH FRIENDS OR RELATIVES?: NEVER
HOW OFTEN DO YOU HAVE SIX OR MORE DRINKS ON ONE OCCASION: NEVER
HOW OFTEN DO YOU ATTENT MEETINGS OF THE CLUB OR ORGANIZATION YOU BELONG TO?: NEVER
IN THE PAST 12 MONTHS, HAS THE ELECTRIC, GAS, OIL, OR WATER COMPANY THREATENED TO SHUT OFF SERVICE IN YOUR HOME?: NO
DO YOU BELONG TO ANY CLUBS OR ORGANIZATIONS SUCH AS CHURCH GROUPS UNIONS, FRATERNAL OR ATHLETIC GROUPS, OR SCHOOL GROUPS?: NO
IN A TYPICAL WEEK, HOW MANY TIMES DO YOU TALK ON THE PHONE WITH FAMILY, FRIENDS, OR NEIGHBORS?: TWICE A WEEK
HOW MANY DRINKS CONTAINING ALCOHOL DO YOU HAVE ON A TYPICAL DAY WHEN YOU ARE DRINKING: PATIENT DOES NOT DRINK
HOW OFTEN DO YOU ATTEND CHURCH OR RELIGIOUS SERVICES?: NEVER
IN A TYPICAL WEEK, HOW MANY TIMES DO YOU TALK ON THE PHONE WITH FAMILY, FRIENDS, OR NEIGHBORS?: TWICE A WEEK
HOW OFTEN DO YOU ATTENT MEETINGS OF THE CLUB OR ORGANIZATION YOU BELONG TO?: NEVER
HOW OFTEN DO YOU HAVE A DRINK CONTAINING ALCOHOL: NEVER
HOW OFTEN DO YOU GET TOGETHER WITH FRIENDS OR RELATIVES?: NEVER
HOW OFTEN DO YOU ATTEND CHURCH OR RELIGIOUS SERVICES?: NEVER

## 2025-01-29 ASSESSMENT — LIFESTYLE VARIABLES
AUDIT-C TOTAL SCORE: 0
HOW MANY STANDARD DRINKS CONTAINING ALCOHOL DO YOU HAVE ON A TYPICAL DAY: PATIENT DOES NOT DRINK
HOW OFTEN DO YOU HAVE SIX OR MORE DRINKS ON ONE OCCASION: NEVER
HOW OFTEN DO YOU HAVE A DRINK CONTAINING ALCOHOL: NEVER
SKIP TO QUESTIONS 9-10: 1

## 2025-01-30 ENCOUNTER — OFFICE VISIT (OUTPATIENT)
Dept: MEDICAL GROUP | Facility: CLINIC | Age: 60
End: 2025-01-30
Payer: MEDICAID

## 2025-01-30 VITALS
WEIGHT: 120.37 LBS | BODY MASS INDEX: 22.15 KG/M2 | OXYGEN SATURATION: 98 % | HEART RATE: 75 BPM | TEMPERATURE: 98 F | HEIGHT: 62 IN | RESPIRATION RATE: 12 BRPM | SYSTOLIC BLOOD PRESSURE: 138 MMHG | DIASTOLIC BLOOD PRESSURE: 80 MMHG

## 2025-01-30 DIAGNOSIS — Z13.21 ENCOUNTER FOR VITAMIN DEFICIENCY SCREENING: ICD-10-CM

## 2025-01-30 DIAGNOSIS — R21 RASH: ICD-10-CM

## 2025-01-30 DIAGNOSIS — R79.89 ABNORMAL CBC: ICD-10-CM

## 2025-01-30 DIAGNOSIS — R79.89 ELEVATED LIVER FUNCTION TESTS: ICD-10-CM

## 2025-01-30 DIAGNOSIS — R76.8 POSITIVE ANA (ANTINUCLEAR ANTIBODY): ICD-10-CM

## 2025-01-30 DIAGNOSIS — I10 PRIMARY HYPERTENSION: ICD-10-CM

## 2025-01-30 DIAGNOSIS — E03.9 ACQUIRED HYPOTHYROIDISM: ICD-10-CM

## 2025-01-30 DIAGNOSIS — N95.2 VAGINAL ATROPHY: ICD-10-CM

## 2025-01-30 ASSESSMENT — PATIENT HEALTH QUESTIONNAIRE - PHQ9
5. POOR APPETITE OR OVEREATING: 0 - NOT AT ALL
CLINICAL INTERPRETATION OF PHQ2 SCORE: 4
SUM OF ALL RESPONSES TO PHQ QUESTIONS 1-9: 16
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
## 2025-01-31 ENCOUNTER — HOSPITAL ENCOUNTER (OUTPATIENT)
Dept: LAB | Facility: MEDICAL CENTER | Age: 60
End: 2025-01-31
Attending: PHYSICIAN ASSISTANT
Payer: MEDICAID

## 2025-01-31 DIAGNOSIS — R79.89 ELEVATED LIVER FUNCTION TESTS: ICD-10-CM

## 2025-01-31 DIAGNOSIS — E03.9 ACQUIRED HYPOTHYROIDISM: ICD-10-CM

## 2025-01-31 DIAGNOSIS — R79.89 ABNORMAL CBC: ICD-10-CM

## 2025-01-31 DIAGNOSIS — I10 PRIMARY HYPERTENSION: ICD-10-CM

## 2025-01-31 DIAGNOSIS — Z13.21 ENCOUNTER FOR VITAMIN DEFICIENCY SCREENING: ICD-10-CM

## 2025-01-31 DIAGNOSIS — R76.8 POSITIVE ANA (ANTINUCLEAR ANTIBODY): ICD-10-CM

## 2025-01-31 LAB
APPEARANCE UR: CLEAR
BACTERIA #/AREA URNS HPF: NORMAL /HPF
BASOPHILS # BLD AUTO: 2 % (ref 0–1.8)
BASOPHILS # BLD: 0.11 K/UL (ref 0–0.12)
BILIRUB UR QL STRIP.AUTO: NEGATIVE
CASTS URNS QL MICRO: NORMAL /LPF (ref 0–2)
COLOR UR: YELLOW
EOSINOPHIL # BLD AUTO: 0.44 K/UL (ref 0–0.51)
EOSINOPHIL NFR BLD: 7.9 % (ref 0–6.9)
EPITHELIAL CELLS 1715: NORMAL /HPF (ref 0–5)
ERYTHROCYTE [DISTWIDTH] IN BLOOD BY AUTOMATED COUNT: 43.6 FL (ref 35.9–50)
ERYTHROCYTE [SEDIMENTATION RATE] IN BLOOD BY WESTERGREN METHOD: 3 MM/HOUR (ref 0–25)
GLUCOSE UR STRIP.AUTO-MCNC: NEGATIVE MG/DL
HCT VFR BLD AUTO: 41.9 % (ref 37–47)
HGB BLD-MCNC: 13.8 G/DL (ref 12–16)
IMM GRANULOCYTES # BLD AUTO: 0.01 K/UL (ref 0–0.11)
IMM GRANULOCYTES NFR BLD AUTO: 0.2 % (ref 0–0.9)
KETONES UR STRIP.AUTO-MCNC: NEGATIVE MG/DL
LEUKOCYTE ESTERASE UR QL STRIP.AUTO: ABNORMAL
LYMPHOCYTES # BLD AUTO: 1.83 K/UL (ref 1–4.8)
LYMPHOCYTES NFR BLD: 32.9 % (ref 22–41)
MCH RBC QN AUTO: 30.5 PG (ref 27–33)
MCHC RBC AUTO-ENTMCNC: 32.9 G/DL (ref 32.2–35.5)
MCV RBC AUTO: 92.7 FL (ref 81.4–97.8)
MICRO URNS: ABNORMAL
MONOCYTES # BLD AUTO: 0.5 K/UL (ref 0–0.85)
MONOCYTES NFR BLD AUTO: 9 % (ref 0–13.4)
NEUTROPHILS # BLD AUTO: 2.68 K/UL (ref 1.82–7.42)
NEUTROPHILS NFR BLD: 48 % (ref 44–72)
NITRITE UR QL STRIP.AUTO: NEGATIVE
NRBC # BLD AUTO: 0 K/UL
NRBC BLD-RTO: 0 /100 WBC (ref 0–0.2)
PH UR STRIP.AUTO: 6 [PH] (ref 5–8)
PLATELET # BLD AUTO: 439 K/UL (ref 164–446)
PMV BLD AUTO: 8.5 FL (ref 9–12.9)
PROT UR QL STRIP: NEGATIVE MG/DL
RBC # BLD AUTO: 4.52 M/UL (ref 4.2–5.4)
RBC # URNS HPF: NORMAL /HPF (ref 0–2)
RBC UR QL AUTO: NEGATIVE
SP GR UR STRIP.AUTO: 1.01
UROBILINOGEN UR STRIP.AUTO-MCNC: 0.2 EU/DL
WBC # BLD AUTO: 5.6 K/UL (ref 4.8–10.8)
WBC #/AREA URNS HPF: NORMAL /HPF

## 2025-01-31 PROCEDURE — 85025 COMPLETE CBC W/AUTO DIFF WBC: CPT

## 2025-01-31 PROCEDURE — 84443 ASSAY THYROID STIM HORMONE: CPT

## 2025-01-31 PROCEDURE — 86141 C-REACTIVE PROTEIN HS: CPT

## 2025-01-31 PROCEDURE — 86160 COMPLEMENT ANTIGEN: CPT

## 2025-01-31 PROCEDURE — 86225 DNA ANTIBODY NATIVE: CPT

## 2025-01-31 PROCEDURE — 80061 LIPID PANEL: CPT

## 2025-01-31 PROCEDURE — 86800 THYROGLOBULIN ANTIBODY: CPT

## 2025-01-31 PROCEDURE — 86039 ANTINUCLEAR ANTIBODIES (ANA): CPT

## 2025-01-31 PROCEDURE — 83516 IMMUNOASSAY NONANTIBODY: CPT

## 2025-01-31 PROCEDURE — 86431 RHEUMATOID FACTOR QUANT: CPT

## 2025-01-31 PROCEDURE — 85652 RBC SED RATE AUTOMATED: CPT

## 2025-01-31 PROCEDURE — 82306 VITAMIN D 25 HYDROXY: CPT

## 2025-01-31 PROCEDURE — 86376 MICROSOMAL ANTIBODY EACH: CPT

## 2025-01-31 PROCEDURE — 86200 CCP ANTIBODY: CPT

## 2025-01-31 PROCEDURE — 36415 COLL VENOUS BLD VENIPUNCTURE: CPT

## 2025-01-31 PROCEDURE — 86235 NUCLEAR ANTIGEN ANTIBODY: CPT

## 2025-01-31 PROCEDURE — 80053 COMPREHEN METABOLIC PANEL: CPT

## 2025-01-31 PROCEDURE — 86038 ANTINUCLEAR ANTIBODIES: CPT

## 2025-01-31 PROCEDURE — 86162 COMPLEMENT TOTAL (CH50): CPT

## 2025-01-31 PROCEDURE — 81001 URINALYSIS AUTO W/SCOPE: CPT

## 2025-02-01 LAB
25(OH)D3 SERPL-MCNC: 47 NG/ML (ref 30–100)
ALBUMIN SERPL BCP-MCNC: 4.9 G/DL (ref 3.2–4.9)
ALBUMIN/GLOB SERPL: 1.8 G/DL
ALP SERPL-CCNC: 77 U/L (ref 30–99)
ALT SERPL-CCNC: 21 U/L (ref 2–50)
ANION GAP SERPL CALC-SCNC: 11 MMOL/L (ref 7–16)
AST SERPL-CCNC: 24 U/L (ref 12–45)
BILIRUB SERPL-MCNC: 0.7 MG/DL (ref 0.1–1.5)
BUN SERPL-MCNC: 17 MG/DL (ref 8–22)
C3 SERPL-MCNC: 127 MG/DL (ref 87–200)
C4 SERPL-MCNC: 25.6 MG/DL (ref 19–52)
CALCIUM ALBUM COR SERPL-MCNC: 9.3 MG/DL (ref 8.5–10.5)
CALCIUM SERPL-MCNC: 10 MG/DL (ref 8.5–10.5)
CHLORIDE SERPL-SCNC: 97 MMOL/L (ref 96–112)
CHOLEST SERPL-MCNC: 230 MG/DL (ref 100–199)
CO2 SERPL-SCNC: 25 MMOL/L (ref 20–33)
CREAT SERPL-MCNC: 0.97 MG/DL (ref 0.5–1.4)
CRP SERPL HS-MCNC: 0.5 MG/L (ref 0–3)
GFR SERPLBLD CREATININE-BSD FMLA CKD-EPI: 67 ML/MIN/1.73 M 2
GLOBULIN SER CALC-MCNC: 2.7 G/DL (ref 1.9–3.5)
GLUCOSE SERPL-MCNC: 101 MG/DL (ref 65–99)
HDLC SERPL-MCNC: 69 MG/DL
LDLC SERPL CALC-MCNC: 135 MG/DL
POTASSIUM SERPL-SCNC: 4.5 MMOL/L (ref 3.6–5.5)
PROT SERPL-MCNC: 7.6 G/DL (ref 6–8.2)
RHEUMATOID FACT SER IA-ACNC: <10 IU/ML (ref 0–14)
SODIUM SERPL-SCNC: 133 MMOL/L (ref 135–145)
THYROPEROXIDASE AB SERPL-ACNC: 434 IU/ML (ref 0–9)
TRIGL SERPL-MCNC: 130 MG/DL (ref 0–149)
TSH SERPL DL<=0.005 MIU/L-ACNC: 5.27 UIU/ML (ref 0.38–5.33)

## 2025-02-03 ENCOUNTER — PATIENT MESSAGE (OUTPATIENT)
Dept: MEDICAL GROUP | Facility: CLINIC | Age: 60
End: 2025-02-03
Payer: MEDICAID

## 2025-02-03 DIAGNOSIS — E03.9 ACQUIRED HYPOTHYROIDISM: ICD-10-CM

## 2025-02-03 DIAGNOSIS — I10 ESSENTIAL HYPERTENSION: ICD-10-CM

## 2025-02-03 LAB
CCP IGA+IGG SERPL IA-ACNC: 3 UNITS (ref 0–19)
CH50 SERPL-ACNC: 72.8 U/ML (ref 38.7–89.9)
DSDNA AB TITR SER CLIF: NORMAL {TITER}
ENA SM IGG SER-ACNC: 2 AU/ML (ref 0–40)
MYELOPEROXIDASE AB SER-ACNC: 0 AU/ML (ref 0–19)
NUCLEAR IGG SER QL IA: DETECTED
PROTEINASE3 AB SER-ACNC: 0 AU/ML (ref 0–19)
THYROGLOB AB SERPL-ACNC: 14.2 IU/ML (ref 0–4)

## 2025-02-04 LAB
ANA PAT SER IF-IMP: NORMAL
NUCLEAR IGG SER QL IF: NORMAL

## 2025-02-04 RX ORDER — PROPRANOLOL HYDROCHLORIDE 10 MG/1
10 TABLET ORAL 3 TIMES DAILY
Qty: 270 TABLET | Refills: 3 | Status: SHIPPED | OUTPATIENT
Start: 2025-02-04

## 2025-02-04 RX ORDER — LISINOPRIL 20 MG/1
20 TABLET ORAL 2 TIMES DAILY
Qty: 180 TABLET | Refills: 3 | Status: SHIPPED | OUTPATIENT
Start: 2025-02-04

## 2025-02-04 RX ORDER — LEVOTHYROXINE SODIUM 100 UG/1
TABLET ORAL
Qty: 90 TABLET | Refills: 3 | Status: SHIPPED | OUTPATIENT
Start: 2025-02-04

## 2025-03-18 ENCOUNTER — TELEMEDICINE (OUTPATIENT)
Dept: MEDICAL GROUP | Facility: CLINIC | Age: 60
End: 2025-03-18
Payer: MEDICAID

## 2025-03-18 VITALS — HEIGHT: 62 IN | BODY MASS INDEX: 22.08 KG/M2 | WEIGHT: 120 LBS

## 2025-03-18 DIAGNOSIS — I73.00 RAYNAUD'S DISEASE WITHOUT GANGRENE: ICD-10-CM

## 2025-03-18 DIAGNOSIS — E78.5 HYPERLIPIDEMIA, UNSPECIFIED HYPERLIPIDEMIA TYPE: ICD-10-CM

## 2025-03-18 DIAGNOSIS — F32.0 CURRENT MILD EPISODE OF MAJOR DEPRESSIVE DISORDER, UNSPECIFIED WHETHER RECURRENT (HCC): ICD-10-CM

## 2025-03-18 DIAGNOSIS — L98.9 SKIN ABNORMALITIES: ICD-10-CM

## 2025-03-18 DIAGNOSIS — E87.1 HYPONATREMIA: ICD-10-CM

## 2025-03-18 DIAGNOSIS — I10 PRIMARY HYPERTENSION: ICD-10-CM

## 2025-03-18 DIAGNOSIS — E03.9 ACQUIRED HYPOTHYROIDISM: ICD-10-CM

## 2025-03-18 DIAGNOSIS — R73.9 HYPERGLYCEMIA: ICD-10-CM

## 2025-03-18 DIAGNOSIS — Z12.11 COLON CANCER SCREENING: ICD-10-CM

## 2025-03-18 DIAGNOSIS — F51.5 NIGHTMARES: ICD-10-CM

## 2025-03-18 DIAGNOSIS — R76.8 POSITIVE ANA (ANTINUCLEAR ANTIBODY): ICD-10-CM

## 2025-03-18 DIAGNOSIS — N95.2 VAGINAL ATROPHY: ICD-10-CM

## 2025-03-18 DIAGNOSIS — L81.3 CAFE AU LAIT SPOTS: ICD-10-CM

## 2025-03-18 DIAGNOSIS — F41.9 ANXIETY: ICD-10-CM

## 2025-03-18 PROCEDURE — 98006 SYNCH AUDIO-VIDEO EST MOD 30: CPT | Performed by: PHYSICIAN ASSISTANT

## 2025-03-18 RX ORDER — PRAZOSIN HYDROCHLORIDE 1 MG/1
CAPSULE ORAL
COMMUNITY
Start: 2025-03-06

## 2025-03-18 RX ORDER — SERTRALINE HYDROCHLORIDE 25 MG/1
TABLET, FILM COATED ORAL
COMMUNITY
Start: 2025-03-06

## 2025-03-18 ASSESSMENT — FIBROSIS 4 INDEX: FIB4 SCORE: 0.72

## 2025-03-18 NOTE — PROGRESS NOTES
Virtual Visit: Established Patient   This visit was conducted via Teams using secure and encrypted videoconferencing technology.   The patient was in their home in the [facility].    The patient's identity was confirmed and verbal consent was obtained for this virtual visit.    Subjective:   CC:   Chief Complaint   Patient presents with    Lab Results     Qian Rodarte is a 59 y.o. female presenting for evaluation and management of:    Lab results she does have hypertension hypothyroidism and has a positive ALIYAH antibody.  ALIYAH ratio was 1:80.  Patient is also seeing psychiatry.  She states that she was placed on prazosin for nightmares and for her raynaud.  She does feel it is helping.  She states psychiatry also increased the sertraline.  She states that she is still having sweating.  She is concerned about lichen sclerosis in the genital area.  She does see psychiatry for anxiety and depression    Patient states that she will have episodes of breaking out in a rash on her face.  She states that she will have a sore develop on her forehead as well.  She did have a birth anand that was removed, but states that she still has a remnant in her hair line.  She states that she also has multiple cafe au lait spots.      She is following up today with her labs and as previously indicated she does have a positive ALIYAH.  Labs also show hyperlipidemia hyponatremia and elevated glucose level.  Thyroid at this time is controlled but her thyroid levels are towards the high end of normal.  She will be due for repeat lab work in 6 months.  Patient also has hypertension.  As patient is presenting virtually we do not have a blood pressure reading at this time.    ROS   Denies any other symptoms unless previously indicated.      Current medicines (including changes today)  Current Outpatient Medications   Medication Sig Dispense Refill    prazosin (MINIPRESS) 1 MG Cap       sertraline (ZOLOFT) 25 MG tablet       levothyroxine  "(SYNTHROID) 100 MCG Tab TAKE ONE TABLET BY MOUTH EVERY MORNING ON AN EMPTY STOMACH 90 Tablet 3    sertraline (ZOLOFT) 50 MG Tab Take 1 Tablet by mouth every day. 90 Tablet 3    lisinopril (PRINIVIL) 20 MG Tab Take 1 Tablet by mouth 2 times a day. 180 Tablet 3    propranolol (INDERAL) 10 MG Tab Take 1 Tablet by mouth 3 times a day. 270 Tablet 3    hydrOXYzine pamoate (VISTARIL) 25 MG Cap Take 25 mg by mouth every 6 hours. Indications: Feeling Anxious       No current facility-administered medications for this visit.       Patient Active Problem List    Diagnosis Date Noted    Cafe au lait spots 03/18/2025    Skin abnormalities 03/18/2025    Hyperlipidemia 03/18/2025    Hyponatremia 03/18/2025    Raynaud's disease without gangrene 03/18/2025    Nightmares 03/18/2025    Hyperglycemia 03/18/2025    Positive ALIYAH (antinuclear antibody) 01/30/2025    Rash 01/30/2025    Vaginal atrophy 01/30/2025    Elevated platelet count 10/22/2020    Epigastric pain 12/24/2019    Elevated liver function tests 02/15/2018    Acquired hypothyroidism 02/15/2018    Tobacco abuse 01/10/2018    Abnormal CBC 03/01/2017    Abdominal discomfort in right upper quadrant 02/08/2017    HTN (hypertension) 06/09/2016    Anxiety 06/09/2016    Depression 06/09/2016        Objective:   Ht 1.575 m (5' 2\")   Wt 54.4 kg (120 lb)   BMI 21.95 kg/m²     Physical Exam:  Constitutional: Alert, no distress, well-groomed.  Skin: No rashes in visible areas.  Eye: Round. Conjunctiva clear, lids normal. No icterus.   ENMT: Lips pink without lesions, good dentition, moist mucous membranes. Phonation normal.  Neck: No masses, no thyromegaly. Moves freely without pain.  Respiratory: Unlabored respiratory effort, no cough or audible wheeze  Psych: Alert and oriented x3, normal affect and mood.      Latest Reference Range & Units 01/31/25 09:53   WBC 4.8 - 10.8 K/uL 5.6   RBC 4.20 - 5.40 M/uL 4.52   Hemoglobin 12.0 - 16.0 g/dL 13.8   Hematocrit 37.0 - 47.0 % 41.9   MCV " 81.4 - 97.8 fL 92.7   MCH 27.0 - 33.0 pg 30.5   MCHC 32.2 - 35.5 g/dL 32.9   RDW 35.9 - 50.0 fL 43.6   Platelet Count 164 - 446 K/uL 439   MPV 9.0 - 12.9 fL 8.5 (L)   Neutrophils-Polys 44.00 - 72.00 % 48.00   Neutrophils (Absolute) 1.82 - 7.42 K/uL 2.68   Lymphocytes 22.00 - 41.00 % 32.90   Lymphs (Absolute) 1.00 - 4.80 K/uL 1.83   Monocytes 0.00 - 13.40 % 9.00   Monos (Absolute) 0.00 - 0.85 K/uL 0.50   Eosinophils 0.00 - 6.90 % 7.90 (H)   Eos (Absolute) 0.00 - 0.51 K/uL 0.44   Basophils 0.00 - 1.80 % 2.00 (H)   Baso (Absolute) 0.00 - 0.12 K/uL 0.11   Immature Granulocytes 0.00 - 0.90 % 0.20   Immature Granulocytes (abs) 0.00 - 0.11 K/uL 0.01   Nucleated RBC 0.00 - 0.20 /100 WBC 0.00   NRBC (Absolute) K/uL 0.00   Sed Rate Westergren 0 - 25 mm/hour 3   Sodium 135 - 145 mmol/L 133 (L)   Potassium 3.6 - 5.5 mmol/L 4.5   Chloride 96 - 112 mmol/L 97   Co2 20 - 33 mmol/L 25   Anion Gap 7.0 - 16.0  11.0   Glucose 65 - 99 mg/dL 101 (H)   Bun 8 - 22 mg/dL 17   Creatinine 0.50 - 1.40 mg/dL 0.97   GFR (CKD-EPI) >60 mL/min/1.73 m 2 67   Calcium 8.5 - 10.5 mg/dL 10.0   Correct Calcium 8.5 - 10.5 mg/dL 9.3   AST(SGOT) 12 - 45 U/L 24   ALT(SGPT) 2 - 50 U/L 21   Alkaline Phosphatase 30 - 99 U/L 77   Total Bilirubin 0.1 - 1.5 mg/dL 0.7   Albumin 3.2 - 4.9 g/dL 4.9   Total Protein 6.0 - 8.2 g/dL 7.6   Globulin 1.9 - 3.5 g/dL 2.7   A-G Ratio g/dL 1.8   C Reactive Protein High Sensitive 0.0 - 3.0 mg/L 0.5   Cholesterol,Tot 100 - 199 mg/dL 230 (H)   Triglycerides 0 - 149 mg/dL 130   HDL >=40 mg/dL 69   LDL <100 mg/dL 135 (H)   25-Hydroxy   Vitamin D 25 30 - 100 ng/mL 47   C3 Complement 87.0 - 200.0 mg/dL 127.0   Complement C4 19.0 - 52.0 mg/dL 25.6   TSH 0.380 - 5.330 uIU/mL 5.270   Rheumatoid Factor -Neph- 0 - 14 IU/mL <10   Cyclic Citrullinated Peptide Ab, IgG/A 0 - 19 Units 3   Anti-Dna -Ds  SEE BELOW   Microsomal -Tpo- Abs 0.0 - 9.0 IU/mL 434.0 (H)   Anti-Thyroglobulin 0.0 - 4.0 IU/mL 14.2 (H)   ALIYAH Interpretive Comment  See Note    Antinuclear Antibody None Detected  Detected !   Antinuclear Antibody (ALIYAH), HEp-2, IgG <1:80  <1:80   Smith Antibodies 0 - 40 AU/mL 2   Myeloperoxidase Ab 0 - 19 AU/mL 0   Serine Proteinase 3, IgG 0 - 19 AU/mL 0   (L): Data is abnormally low  (H): Data is abnormally high  !: Data is abnormal    Assessment and Plan:   The following treatment plan was discussed:     1. Hyperlipidemia, unspecified hyperlipidemia type  - Lipid Profile; Future  - Comp Metabolic Panel; Future  2. Hyponatremia  3. Acquired hypothyroidism  - TSH WITH REFLEX TO FT4; Future  4. Hyperglycemia  - HEMOGLOBIN A1C; Future    Labs discussed with patient.  Will repeat labs in approximately 4 months.  Can continue to monitor antibodies of thyroid yearly, sooner if needed.    5. Positive ALIYAH (antinuclear antibody)    Continue to monitor.  Repeat labs in 1 year.    6. Primary hypertension    Patient will be seen in the next couple weeks.  Can reevaluate blood pressure when seen in office.    7. Cafe au lait spots  - Referral to Dermatology  8. Skin abnormalities  - Referral to Dermatology    Referral to dermatology submitted.    9. Vaginal atrophy    Patient coming in for evaluation in 2 weeks we will provide pelvic exam at that time.    10. Raynaud's disease without gangrene  11. Nightmares  12. Current mild episode of major depressive disorder, unspecified whether recurrent (HCC)  13. Anxiety    Continue to follow-up with psychiatry.    14. Colon cancer screening  - Referral to GI for Colonoscopy    Referral submitted.    Other orders  - prazosin (MINIPRESS) 1 MG Cap  - sertraline (ZOLOFT) 25 MG tablet      Follow-up: No follow-ups on file.

## 2025-03-21 ENCOUNTER — TELEPHONE (OUTPATIENT)
Dept: MEDICAL GROUP | Facility: CLINIC | Age: 60
End: 2025-03-21
Payer: MEDICAID

## 2025-03-21 NOTE — Clinical Note
REFERRAL APPROVAL NOTICE         Sent on March 21, 2025                   Qian Rodarte  [address]  [place] [address]                   Dear Ms. Rodarte,    After a careful review of the medical information and benefit coverage, [facility] has processed your referral. See below for additional details.    If applicable, you must be actively enrolled with your insurance for coverage of the authorized service. If you have any questions regarding your coverage, please contact your insurance directly.    REFERRAL INFORMATION   Referral #:  [id number]  Referred-To Service Location    Referred-By Provider:  Gastroenterology    Bessie Boone P.A.-C.   [facility]      [address] [address]  [address]  [facility] [address]  [phone] [address]  [facility] [address]  [phone]    Referral Start Date:  03/18/2025  Referral End Date:   03/18/2026             SCHEDULING  If you do not already have an appointment, please call [phone] to make an appointment.     MORE INFORMATION  If you do not already have a [email or web address] account, sign up at: [email or web address]  You can access your medical information, make appointments, see lab results, billing information, and more.  If you have questions regarding this referral, please contact  the [facility] Referrals department at:             [phone]. Monday - Friday 8:00AM - 5:00PM.     Sincerely,    [facility]

## 2025-03-21 NOTE — TELEPHONE ENCOUNTER
Caller Name: Qian Rodarte    Call Back Number: [phone]      How would the patient prefer to be contacted with a response: Phone call OK to leave a detailed message    Pt had an appt on 3/27 that had to be rescheduled due to pcp meeting. She is having a lot of pelvic pain and is wondering if there is something that she can do to alleviate the pain. She is not looking for pain meds, but is wondering if there is something that she might be able to put in her pelvic region.

## 2025-03-21 NOTE — Clinical Note
REFERRAL APPROVAL NOTICE         Sent on March 21, 2025                   Qian Rodarte  [address]  [place] [address]                   Dear Ms. Rodarte,    After a careful review of the medical information and benefit coverage, [facility] has processed your referral. See below for additional details.    If applicable, you must be actively enrolled with your insurance for coverage of the authorized service. If you have any questions regarding your coverage, please contact your insurance directly.    REFERRAL INFORMATION   Referral #:  [id number]  Referred-To Provider    Referred-By Provider:  Dermatology    Bessie Boone P.A.-C.   [facility]      [address]  [place] [address]  [phone] [address]  [place] [address]  [phone]    Referral Start Date:  03/18/2025  Referral End Date:   03/18/2026             SCHEDULING  If you do not already have an appointment, please call [phone] to make an appointment.     MORE INFORMATION  If you do not already have a [employer] account, sign up at: [email or web address]  You can access your medical information, make appointments, see lab results, billing information, and more.  If you have questions regarding this referral, please contact  the [facility] Referrals department at:             [phone]. Monday - Friday 8:00AM - 5:00PM.     Sincerely,    [facility]

## 2025-03-24 NOTE — TELEPHONE ENCOUNTER
It is difficult to know how to answer without being able to evaluate the problem.  If it is severe, I recommend an urgent care or the ER.  She should be seen for this.

## 2025-03-25 ENCOUNTER — PATIENT MESSAGE (OUTPATIENT)
Dept: MEDICAL GROUP | Facility: CLINIC | Age: 60
End: 2025-03-25
Payer: MEDICAID

## 2025-03-25 DIAGNOSIS — N76.1 CHRONIC VAGINITIS: ICD-10-CM

## 2025-03-25 NOTE — TELEPHONE ENCOUNTER
She says that she can not go anywhere. She was not very happy and said she would just deal with the pain
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
## 2025-03-27 ENCOUNTER — PATIENT MESSAGE (OUTPATIENT)
Dept: MEDICAL GROUP | Facility: CLINIC | Age: 60
End: 2025-03-27
Payer: MEDICAID

## 2025-03-27 DIAGNOSIS — N76.1 CHRONIC VAGINITIS: ICD-10-CM

## 2025-03-27 RX ORDER — METRONIDAZOLE 500 MG/1
500 TABLET ORAL 2 TIMES DAILY
Qty: 14 TABLET | Refills: 0 | Status: SHIPPED | OUTPATIENT
Start: 2025-03-27

## 2025-03-27 RX ORDER — METRONIDAZOLE 500 MG/1
500 TABLET ORAL 2 TIMES DAILY
Qty: 14 TABLET | Refills: 0 | Status: SHIPPED | OUTPATIENT
Start: 2025-03-27 | End: 2025-03-27 | Stop reason: SDUPTHER

## 2025-03-27 RX ORDER — FLUCONAZOLE 150 MG/1
TABLET ORAL
Qty: 2 TABLET | Refills: 0 | Status: SHIPPED | OUTPATIENT
Start: 2025-03-27 | End: 2025-03-27 | Stop reason: SDUPTHER

## 2025-03-27 RX ORDER — FLUCONAZOLE 150 MG/1
TABLET ORAL
Qty: 2 TABLET | Refills: 0 | Status: SHIPPED | OUTPATIENT
Start: 2025-03-27

## 2025-04-28 ENCOUNTER — APPOINTMENT (OUTPATIENT)
Dept: MEDICAL GROUP | Facility: CLINIC | Age: 60
End: 2025-04-28
Payer: MEDICAID

## 2025-05-13 ENCOUNTER — APPOINTMENT (OUTPATIENT)
Dept: MEDICAL GROUP | Facility: CLINIC | Age: 60
End: 2025-05-13
Payer: MEDICAID

## 2025-05-13 ENCOUNTER — HOSPITAL ENCOUNTER (OUTPATIENT)
Facility: MEDICAL CENTER | Age: 60
End: 2025-05-13
Attending: PHYSICIAN ASSISTANT
Payer: MEDICAID

## 2025-05-13 VITALS
OXYGEN SATURATION: 98 % | DIASTOLIC BLOOD PRESSURE: 72 MMHG | SYSTOLIC BLOOD PRESSURE: 126 MMHG | WEIGHT: 124.78 LBS | HEART RATE: 54 BPM | RESPIRATION RATE: 16 BRPM | BODY MASS INDEX: 22.96 KG/M2 | HEIGHT: 62 IN | TEMPERATURE: 97.2 F

## 2025-05-13 DIAGNOSIS — A60.00 GENITAL HERPES SIMPLEX, UNSPECIFIED SITE: ICD-10-CM

## 2025-05-13 DIAGNOSIS — N95.2 VAGINAL ATROPHY: ICD-10-CM

## 2025-05-13 DIAGNOSIS — R10.2 PELVIC PAIN: ICD-10-CM

## 2025-05-13 PROCEDURE — 3074F SYST BP LT 130 MM HG: CPT | Performed by: PHYSICIAN ASSISTANT

## 2025-05-13 PROCEDURE — 87660 TRICHOMONAS VAGIN DIR PROBE: CPT

## 2025-05-13 PROCEDURE — 87510 GARDNER VAG DNA DIR PROBE: CPT

## 2025-05-13 PROCEDURE — 87480 CANDIDA DNA DIR PROBE: CPT

## 2025-05-13 PROCEDURE — 87624 HPV HI-RISK TYP POOLED RSLT: CPT

## 2025-05-13 PROCEDURE — 3078F DIAST BP <80 MM HG: CPT | Performed by: PHYSICIAN ASSISTANT

## 2025-05-13 PROCEDURE — 87491 CHLMYD TRACH DNA AMP PROBE: CPT

## 2025-05-13 PROCEDURE — 88142 CYTOPATH C/V THIN LAYER: CPT

## 2025-05-13 PROCEDURE — 87591 N.GONORRHOEAE DNA AMP PROB: CPT

## 2025-05-13 PROCEDURE — 99213 OFFICE O/P EST LOW 20 MIN: CPT | Performed by: PHYSICIAN ASSISTANT

## 2025-05-13 RX ORDER — CONJUGATED ESTROGENS 0.62 MG/G
CREAM VAGINAL
Qty: 30 G | Refills: 2 | Status: SHIPPED | OUTPATIENT
Start: 2025-05-13

## 2025-05-13 RX ORDER — ACYCLOVIR 400 MG/1
400 TABLET ORAL 2 TIMES DAILY
Qty: 60 TABLET | Refills: 5 | Status: SHIPPED | OUTPATIENT
Start: 2025-05-13

## 2025-05-13 ASSESSMENT — FIBROSIS 4 INDEX: FIB4 SCORE: 0.72

## 2025-05-13 NOTE — PROGRESS NOTES
cc:  pelvic pain    Subjective:     Qian Rodarte is a 59 y.o. female presenting for pelvic pain        History of Present Illness  The patient is a 59-year-old female who presents to the office today complaining of pelvic pain and needing a pelvic exam.    She reports an improvement in her symptoms following the administration of Flagyl and fluconazole, although complete resolution has not been achieved. She has been utilizing a hypochlorous solution as a perineal cleanser twice daily for the past 2 months. The onset of her symptoms was in 08/2024. She describes an unusual sensation in her vaginal area, akin to the presence of a foreign object. She has abstained from using her treadmill due to exacerbation of her symptoms. She has been under increased stress recently. She does not insert the hypochlorous solution internally but uses it externally for cleansing. She has a history of E. coli infection and was previously on medication for the same. She has undergone a hysterectomy and oophorectomy.    She has noticed an orange discoloration in her mouth, which she initially attributed to coffee stains. She has been scrubbing her tongue as a result. She has also observed a rash around her mouth, which has shown gradual improvement.    She has received the shingles vaccine and is experiencing increased joint, back, and muscle pain.    PAST SURGICAL HISTORY: Hysterectomy and oophorectomy.       Review of systems:  See above.   Denies any symptoms unless previously indicated.        Current Outpatient Medications:     acyclovir (ZOVIRAX) 400 MG tablet, Take 1 Tablet by mouth 2 times a day., Disp: 60 Tablet, Rfl: 5    estrogens, conjugated (PREMARIN) 0.625 MG/GM Cream, Apply a pea size amount to vaginal tissue 3 times a week., Disp: 30 g, Rfl: 2    prazosin (MINIPRESS) 1 MG Cap, , Disp: , Rfl:     levothyroxine (SYNTHROID) 100 MCG Tab, TAKE ONE TABLET BY MOUTH EVERY MORNING ON AN EMPTY STOMACH, Disp: 90 Tablet, Rfl: 3     "sertraline (ZOLOFT) 50 MG Tab, Take 1 Tablet by mouth every day., Disp: 90 Tablet, Rfl: 3    lisinopril (PRINIVIL) 20 MG Tab, Take 1 Tablet by mouth 2 times a day., Disp: 180 Tablet, Rfl: 3    propranolol (INDERAL) 10 MG Tab, Take 1 Tablet by mouth 3 times a day., Disp: 270 Tablet, Rfl: 3    hydrOXYzine pamoate (VISTARIL) 25 MG Cap, Take 25 mg by mouth every 6 hours. Indications: Feeling Anxious, Disp: , Rfl:     fluconazole (DIFLUCAN) 150 MG tablet, One tab weekly for 2 weeks (Patient not taking: Reported on 5/13/2025), Disp: 2 Tablet, Rfl: 0    metroNIDAZOLE (FLAGYL) 500 MG Tab, Take 1 Tablet by mouth 2 times a day. (Patient not taking: Reported on 5/13/2025), Disp: 14 Tablet, Rfl: 0    Allergies, past medical history, past surgical history, family history, social history reviewed and updated    Objective:     Vitals: /72 (BP Location: Left arm, Patient Position: Sitting, BP Cuff Size: Adult)   Pulse (!) 54   Temp 36.2 °C (97.2 °F) (Temporal)   Resp 16   Ht 1.575 m (5' 2\")   Wt 56.6 kg (124 lb 12.5 oz)   SpO2 98%   BMI 22.82 kg/m²   General: Alert, pleasant, NAD  EYES:   PERRL, EOMI, no icterus or pallor.  Conjunctivae and lids normal.   HENT:  Normocephalic.  External ears normal. Tympanic membranes pearly, opaque.  Neck supple.     Respiratory: Normal respiratory effort.  Clear to auscultation bilaterally.  Abdomen: Not obese  Pelvic exam: erythema of the internal labia with vesicles and white flat plaque.  Possible herpetic outbreak.  Vaginal atrophy.  Uterus is absent  Skin: Warm, dry, no rashes.  Musculoskeletal: Gait is normal.  Moves all extremities well.    Neurological: No tremors, sensation grossly intact,  CN2-12 intact.  Psych:  Affect/mood is normal, judgement is good, memory is intact, grooming is appropriate.    Physical Exam  Genitourinary: Red and inflamed genital area, presence of an orange substance in the vaginal canal, no cervix present  Pelvic: No lesions, no discharge, no " cervical motion tenderness, no adnexal masses, no cervical masses, normal cervical os       Assessment/Plan:     Qian was seen today for vaginal pain.    Diagnoses and all orders for this visit:    Genital herpes simplex, unspecified site  -     acyclovir (ZOVIRAX) 400 MG tablet; Take 1 Tablet by mouth 2 times a day.  -     THINPREP PAP W/HPV AND CTNG; Future  -     VAGINAL PATHOGENS DNA PANEL; Future  -     Viral Culture, Rapid, Lesion    Vaginal atrophy  -     estrogens, conjugated (PREMARIN) 0.625 MG/GM Cream; Apply a pea size amount to vaginal tissue 3 times a week.  -     THINPREP PAP W/HPV AND CTNG; Future  -     VAGINAL PATHOGENS DNA PANEL; Future  -     Viral Culture, Rapid, Lesion    Pelvic pain  -     Referral to Gynecology  -     THINPREP PAP W/HPV AND CTNG; Future  -     VAGINAL PATHOGENS DNA PANEL; Future  -     Viral Culture, Rapid, Lesion        Assessment & Plan  1. Pelvic pain.  - The differential diagnosis includes a potential herpes outbreak, vaginal atrophy, or an unidentified etiology.  - An orange-colored substance was observed in the vaginal canal.  - A vaginal Pap smear, herpetic culture, and vaginal pathogens culture will be submitted to the lab for analysis. She will be informed of the results upon receipt. Specimens have been collected for this purpose.  - A referral to gynecology for further evaluation will be initiated. She will be started on acyclovir 400 mg twice daily and Premarin, to be applied vaginally three times a week.    2. Vaginal atrophy.  - She will be started on Premarin cream, to be applied vaginally three times a week.  - It is expected to take 3 to 6 months to see improvement.  - She is advised to apply a pea-sized amount on the fingertip and rub it into the vaginal tissue before bed.  - Follow-up in 4 to 6 weeks to assess response to treatment.    3. Herpes outbreak.  - Acyclovir 400 mg will be prescribed to be taken twice daily without breaks.  - This treatment aims  to calm down the potential herpes outbreak.  - If it is a herpetic outbreak, the acyclovir may have a more immediate effect.  - Follow-up in 4 to 6 weeks to monitor the effectiveness of the treatment.    Return for tila 10 if possible.  follow up 4 weeks..    Please note that this dictation was created using voice recognition software. I have made every reasonable attempt to correct obvious errors, but expect that there are errors of grammar and possible content that I did not discover before finalizing note.

## 2025-05-14 ENCOUNTER — TELEPHONE (OUTPATIENT)
Dept: MEDICAL GROUP | Facility: CLINIC | Age: 60
End: 2025-05-14
Payer: MEDICAID

## 2025-05-14 ENCOUNTER — RESULTS FOLLOW-UP (OUTPATIENT)
Dept: MEDICAL GROUP | Facility: CLINIC | Age: 60
End: 2025-05-14

## 2025-05-14 LAB
C TRACH DNA GENITAL QL NAA+PROBE: NEGATIVE
CANDIDA DNA VAG QL PROBE+SIG AMP: NEGATIVE
G VAGINALIS DNA VAG QL PROBE+SIG AMP: NEGATIVE
N GONORRHOEA DNA GENITAL QL NAA+PROBE: NEGATIVE
SPECIMEN SOURCE: NORMAL
T VAGINALIS DNA VAG QL PROBE+SIG AMP: NEGATIVE

## 2025-05-14 NOTE — TELEPHONE ENCOUNTER
Leslie from the lab called stating that a recollect will need to be taken for the Herpes swab. It will need to be collected in a different tube

## 2025-05-16 NOTE — Clinical Note
REFERRAL APPROVAL NOTICE         Sent on May 16, 2025                   Qian Rodarte  [address]  [facility] [address]                   Dear Ms. Rodarte,    After a careful review of the medical information and benefit coverage, [facility] has processed your referral. See below for additional details.    If applicable, you must be actively enrolled with your insurance for coverage of the authorized service. If you have any questions regarding your coverage, please contact your insurance directly.    REFERRAL INFORMATION   Referral #:  [id number]  Referred-To Provider    Referred-By Provider:  OB & Gyn - Gynecology    Bessie Boone P.A.-C.   [address] [address]  [phone] [address] [facility] [place] [address]  [phone]    Referral Start Date:  05/13/2025  Referral End Date:   05/13/2026             SCHEDULING  If you do not already have an appointment, please call [phone] to make an appointment.     MORE INFORMATION  If you do not already have a [employer] account, sign up at: [email or web address]  You can access your medical information, make appointments, see lab results, billing information, and more.  If you have questions regarding this referral, please contact  the [facility] Referrals department at:             [phone]. Monday - Friday 8:00AM - 5:00PM.     Sincerely,    [facility]

## 2025-05-21 LAB
HPV I/H RISK 1 DNA SPEC QL NAA+PROBE: NOT DETECTED
SPECIMEN SOURCE: NORMAL
THINPREP PAP, CYTOLOGY NL11781: NORMAL

## 2025-06-10 ENCOUNTER — OFFICE VISIT (OUTPATIENT)
Dept: MEDICAL GROUP | Facility: CLINIC | Age: 60
End: 2025-06-10
Payer: MEDICAID

## 2025-06-10 VITALS
TEMPERATURE: 98.7 F | HEART RATE: 69 BPM | DIASTOLIC BLOOD PRESSURE: 62 MMHG | SYSTOLIC BLOOD PRESSURE: 118 MMHG | WEIGHT: 119.49 LBS | OXYGEN SATURATION: 97 % | BODY MASS INDEX: 21.99 KG/M2 | RESPIRATION RATE: 18 BRPM | HEIGHT: 62 IN

## 2025-06-10 DIAGNOSIS — N95.2 VAGINAL ATROPHY: Primary | ICD-10-CM

## 2025-06-10 DIAGNOSIS — M25.50 MULTIPLE JOINT PAIN: ICD-10-CM

## 2025-06-10 DIAGNOSIS — R76.8 POSITIVE ANA (ANTINUCLEAR ANTIBODY): ICD-10-CM

## 2025-06-10 PROCEDURE — 99213 OFFICE O/P EST LOW 20 MIN: CPT | Performed by: PHYSICIAN ASSISTANT

## 2025-06-10 PROCEDURE — 3078F DIAST BP <80 MM HG: CPT | Performed by: PHYSICIAN ASSISTANT

## 2025-06-10 PROCEDURE — 3074F SYST BP LT 130 MM HG: CPT | Performed by: PHYSICIAN ASSISTANT

## 2025-06-10 RX ORDER — CELECOXIB 100 MG/1
100 CAPSULE ORAL
Qty: 90 CAPSULE | Refills: 1 | Status: SHIPPED | OUTPATIENT
Start: 2025-06-10

## 2025-06-10 ASSESSMENT — FIBROSIS 4 INDEX: FIB4 SCORE: 0.72

## 2025-06-10 NOTE — PROGRESS NOTES
cc:  vaginal discharge    Subjective:     Qian Rodarte is a 59 y.o. female presenting for vaginal discharge        History of Present Illness  The patient is a 59-year-old female who presents to the office today for a follow-up regarding vaginal discharge. She was seen on 05/13/2025, during which a vaginal pathogens panel was conducted. The panel returned negative results for candida, trichomonas, and Gardnerella. A Pap smear indicated cervical atrophy, and tests for gonorrhea and chlamydia were also negative.    She reports a slight improvement in her condition, attributing it to the use of Premarin, although she notes that the duration of treatment has been short. She continues to experience pain during bowel movements, which she suspects may be due to hemorrhoids, as evidenced by occasional bleeding. This symptom has also shown some improvement. She hypothesizes that her previous reluctance to defecate due to pain may have led to self-induced constipation. The use of laxatives resulted in leakage, which she believes could have contributed to the infection. She describes an intermittent rash, the cause of which remains uncertain. She has been under significant stress recently. She has not experienced any major issues with shingles since receiving the vaccine. She recalls a previous treatment for yeast and Gardnerella infections, which resulted in some improvement. She has been maintaining hygiene by cleaning with hydrochlorothia twice daily.    She is currently on prazosin and sertraline, prescribed by her psychiatrist, but is unsure of the duration of this treatment. Prazosin has been effective in managing her nightmares and blood pressure. She experiences morning stiffness and arthritis pain, particularly in adverse weather conditions, and manages these symptoms with Advil. She also reports shoulder and knee pain, which she attributes to a fall caused by her dogs. She applies Diflucan to her shoulder and knees,  "which provides some relief. She has not previously used Celebrex. She finds that her symptoms improve with physical activity and movement. However, she expresses concern about potential atrophy due to a recent decrease in her daily walking routine, which she believes has negatively impacted her mood. She is uncertain whether to continue exercising or to reduce her activity level.    SOCIAL HISTORY  - Former smoker  - Former drinker    FAMILY HISTORY  - Mother has autoimmune disease       Review of systems:  See above.   Denies any symptoms unless previously indicated.      Current Medications[1]    Allergies, past medical history, past surgical history, family history, social history reviewed and updated    Objective:     Vitals: /62 (BP Location: Left arm, Patient Position: Sitting, BP Cuff Size: Adult)   Pulse 69   Temp 37.1 °C (98.7 °F) (Temporal)   Resp 18   Ht 1.575 m (5' 2\")   Wt 54.2 kg (119 lb 7.8 oz)   SpO2 97%   BMI 21.85 kg/m²   General: Alert, pleasant, NAD  EYES:   PERRL, EOMI, no icterus or pallor.  Conjunctivae and lids normal.   HENT:  Normocephalic.  External ears normal.  Neck supple.    Respiratory: Normal respiratory effort.    Abdomen: Not obese  Skin: Warm, dry, no rashes.  Musculoskeletal: Gait is normal.  Moves all extremities well.    Extremities: normal range of motion all extremities.   Neurological: No tremors, sensation grossly intact, CN2-12 intact.  Psych:  Affect/mood is normal, judgement is good, memory is intact, grooming is appropriate.    Results  Labs   - Vaginal pathogens panel: 05/13/2025, Negative for candida, trichomonas and Gardnerella   - Pap smear: 05/13/2025, Cervical atrophy   - Gonorrhea test: 05/13/2025, Negative   - Chlamydia test: 05/13/2025, Negative       Assessment/Plan:     There are no diagnoses linked to this encounter.    Assessment & Plan  1. Vaginal discharge.  - The vaginal pathogens panel was negative for candida, trichomonas, and " Gardnerella.  - Pap smear showed cervical atrophy. Gonorrhea and Chlamydia tests were also negative.  - Premarin might be helping slightly but not significantly yet; discussed that it usually takes at least 3 months for significant improvement.  - Continue using Premarin and monitor symptoms; follow up with gynecology for further evaluation.    2. Joint pain/positive ALIYAH.  - Reports stiffness and joint pain, particularly in the mornings and during bad weather.  - Possibility of underlying autoimmune process or rheumatoid arthritis.  - Prescription for Celebrex 100 mg once daily provided, with a 90-day supply and one refill; advised to take it as needed.  - If symptoms persist or worsen, further evaluation may be necessary.    No follow-ups on file.    Please note that this dictation was created using voice recognition software. I have made every reasonable attempt to correct obvious errors, but expect that there are errors of grammar and possible content that I did not discover before finalizing note.               [1]   Current Outpatient Medications:     acyclovir (ZOVIRAX) 400 MG tablet, Take 1 Tablet by mouth 2 times a day., Disp: 60 Tablet, Rfl: 5    estrogens, conjugated (PREMARIN) 0.625 MG/GM Cream, Apply a pea size amount to vaginal tissue 3 times a week., Disp: 30 g, Rfl: 2    prazosin (MINIPRESS) 1 MG Cap, , Disp: , Rfl:     levothyroxine (SYNTHROID) 100 MCG Tab, TAKE ONE TABLET BY MOUTH EVERY MORNING ON AN EMPTY STOMACH, Disp: 90 Tablet, Rfl: 3    sertraline (ZOLOFT) 50 MG Tab, Take 1 Tablet by mouth every day., Disp: 90 Tablet, Rfl: 3    lisinopril (PRINIVIL) 20 MG Tab, Take 1 Tablet by mouth 2 times a day., Disp: 180 Tablet, Rfl: 3    propranolol (INDERAL) 10 MG Tab, Take 1 Tablet by mouth 3 times a day., Disp: 270 Tablet, Rfl: 3    hydrOXYzine pamoate (VISTARIL) 25 MG Cap, Take 25 mg by mouth every 6 hours. Indications: Feeling Anxious, Disp: , Rfl:     fluconazole (DIFLUCAN) 150 MG tablet, One tab  weekly for 2 weeks (Patient not taking: Reported on 5/13/2025), Disp: 2 Tablet, Rfl: 0    metroNIDAZOLE (FLAGYL) 500 MG Tab, Take 1 Tablet by mouth 2 times a day. (Patient not taking: Reported on 5/13/2025), Disp: 14 Tablet, Rfl: 0

## 2025-06-11 ENCOUNTER — APPOINTMENT (OUTPATIENT)
Dept: MEDICAL GROUP | Facility: CLINIC | Age: 60
End: 2025-06-11
Payer: MEDICAID

## 2025-06-13 ENCOUNTER — TELEPHONE (OUTPATIENT)
Dept: MEDICAL GROUP | Facility: CLINIC | Age: 60
End: 2025-06-13
Payer: MEDICAID